# Patient Record
Sex: FEMALE | Race: WHITE | Employment: UNEMPLOYED | URBAN - METROPOLITAN AREA
[De-identification: names, ages, dates, MRNs, and addresses within clinical notes are randomized per-mention and may not be internally consistent; named-entity substitution may affect disease eponyms.]

---

## 2021-08-17 ENCOUNTER — TRANSCRIBE ORDER (OUTPATIENT)
Dept: SCHEDULING | Age: 56
End: 2021-08-17

## 2021-08-17 DIAGNOSIS — Z12.31 VISIT FOR SCREENING MAMMOGRAM: Primary | ICD-10-CM

## 2021-10-25 ENCOUNTER — HOSPITAL ENCOUNTER (OUTPATIENT)
Dept: MAMMOGRAPHY | Age: 56
Discharge: HOME | End: 2021-10-25
Attending: SPECIALIST
Payer: COMMERCIAL

## 2021-10-25 ENCOUNTER — TRANSCRIBE ORDER (OUTPATIENT)
Dept: GENERAL RADIOLOGY | Age: 56
End: 2021-10-25

## 2021-10-25 DIAGNOSIS — R92.8 ABNORMAL MAMMOGRAM: Primary | ICD-10-CM

## 2021-10-25 DIAGNOSIS — Z12.31 VISIT FOR SCREENING MAMMOGRAM: ICD-10-CM

## 2021-10-25 PROCEDURE — 77067 SCR MAMMO BI INCL CAD: CPT

## 2021-11-04 ENCOUNTER — HOSPITAL ENCOUNTER (OUTPATIENT)
Dept: MAMMOGRAPHY | Age: 56
Discharge: HOME | End: 2021-11-04
Attending: SPECIALIST
Payer: COMMERCIAL

## 2021-11-04 ENCOUNTER — HOSPITAL ENCOUNTER (OUTPATIENT)
Dept: ULTRASOUND IMAGING | Age: 56
Discharge: HOME | End: 2021-11-04
Attending: SPECIALIST
Payer: COMMERCIAL

## 2021-11-04 DIAGNOSIS — R92.8 ABNORMAL MAMMOGRAM: ICD-10-CM

## 2021-11-04 PROCEDURE — 77061 BREAST TOMOSYNTHESIS UNI: CPT

## 2021-11-04 PROCEDURE — 76642 ULTRASOUND BREAST LIMITED: CPT

## 2021-11-05 ENCOUNTER — TRANSCRIBE ORDER (OUTPATIENT)
Dept: SCHEDULING | Age: 56
End: 2021-11-05

## 2021-11-05 DIAGNOSIS — N63.0 LUMP OR MASS IN BREAST: Primary | ICD-10-CM

## 2021-11-12 ENCOUNTER — HOSPITAL ENCOUNTER (OUTPATIENT)
Dept: MAMMOGRAPHY | Age: 56
Discharge: HOME | End: 2021-11-12
Attending: SPECIALIST
Payer: COMMERCIAL

## 2021-11-12 ENCOUNTER — HOSPITAL ENCOUNTER (OUTPATIENT)
Dept: ULTRASOUND IMAGING | Age: 56
Discharge: HOME | End: 2021-11-12
Attending: SPECIALIST
Payer: COMMERCIAL

## 2021-11-12 DIAGNOSIS — R92.8 ABNORMAL MAMMOGRAM OF RIGHT BREAST: ICD-10-CM

## 2021-11-12 DIAGNOSIS — N63.0 LUMP OR MASS IN BREAST: ICD-10-CM

## 2021-11-12 PROCEDURE — 88360 TUMOR IMMUNOHISTOCHEM/MANUAL: CPT

## 2021-11-12 PROCEDURE — 77065 DX MAMMO INCL CAD UNI: CPT

## 2021-11-12 PROCEDURE — 88305 TISSUE EXAM BY PATHOLOGIST: CPT

## 2021-11-12 PROCEDURE — 74011000250 HC RX REV CODE- 250: Performed by: RADIOLOGY

## 2021-11-12 PROCEDURE — 77030020004 US BX BREAST VAC RT 1ST LESION W/CLIP AND SPECIMEN

## 2021-11-12 PROCEDURE — 88342 IMHCHEM/IMCYTCHM 1ST ANTB: CPT

## 2021-11-12 PROCEDURE — 88341 IMHCHEM/IMCYTCHM EA ADD ANTB: CPT

## 2021-11-12 RX ORDER — LIDOCAINE HYDROCHLORIDE 10 MG/ML
8 INJECTION, SOLUTION EPIDURAL; INFILTRATION; INTRACAUDAL; PERINEURAL
Status: COMPLETED | OUTPATIENT
Start: 2021-11-12 | End: 2021-11-12

## 2021-11-12 RX ORDER — LIDOCAINE HYDROCHLORIDE AND EPINEPHRINE 10; 10 MG/ML; UG/ML
1.5 INJECTION, SOLUTION INFILTRATION; PERINEURAL ONCE
Status: COMPLETED | OUTPATIENT
Start: 2021-11-12 | End: 2021-11-12

## 2021-11-12 RX ADMIN — LIDOCAINE HYDROCHLORIDE 8 ML: 10 INJECTION, SOLUTION EPIDURAL; INFILTRATION; INTRACAUDAL; PERINEURAL at 13:00

## 2021-11-12 RX ADMIN — LIDOCAINE HYDROCHLORIDE AND EPINEPHRINE 15 MG: 10; 10 INJECTION, SOLUTION INFILTRATION; PERINEURAL at 13:00

## 2021-11-12 NOTE — PROGRESS NOTES
Mammogram completed. Ok per Dr. Deonna Mendoza to discharge patient. Ice pack provided, instructions for use reviewed.

## 2021-11-12 NOTE — PROGRESS NOTES
Discharge instructions have been reviewed with patient. Copy given to patient. Patient verbalized understanding of instructions and was given opportunity to ask questions and receive answers prior to leaving. Patient discharged to radiology waiting room in stable condition.

## 2021-11-12 NOTE — DISCHARGE INSTRUCTIONS
[address]  [phone]      Breast Biopsy Discharge Instructions          1. After the biopsy, we will place a clean covered ice pack over the biopsy site, within the bra - you should leave the ice pack on 30 minutes and then remove the ice pack for 1-2 hours until bedtime. If needed you can continue applying ice the following day. It is a good idea to wear your bra for support, both day and night unless this causes you discomfort. 2. You may take Tylenol (two tablets) every 4 to 6 hours as needed for pain. Do not take aspirin or aspirin products (e.g. ibuprofen, Advil, Motrin) as these may cause more bleeding. 3. You may expect some bruising and skin discoloration in the biopsy area. This is normal and generally should resolve in 5 to 7 days. 4. Most women do not find it necessary to restrict their activities after the procedure. You should rest as needed on the day of your biopsy. The next day, if you are feeling okay, you may resume your regular work/activity schedule. Avoid strenuous activity and heavy lifting, jogging, aerobics, or vacuuming for 48 hours after the procedure. 5. 48 hours after your biopsy, remove the large outer dressing and leave the steri-strips (tiny pieces of tape) in place. The steri-strips will usually fall off in a few days. You may shower 48 hours after your biopsy and you may get the steri-strips wet. If still present after 4 days, you may gently peel the strips off. Keep the area clean and dry and shower daily. 6. If you have bleeding from the incision area, hold firm pressure on the area for 20 minutes. This should control any slight oozing that might occur.   If you develop persistent bleeding or pain which does not respond to the above measures or if you develop a fever, excessive swelling, redness, heat or drainage, please call the Breast Health Navigator at [phone] during normal business hours (7 a.m. - 5 p.m.). After business hours, call [phone] and ask for the on-call Radiology Nurse to be paged, or your referring physician. 7. You will receive your biopsy results (pathology report) in 3-5 business days - the radiologist will review your results and you will receive a call from the radiology department and/or from your doctor.       Physician :_Dr. Michelle Stacy__________     Nurse: Sumi Wiley RN__________        Tech: ______________

## 2021-11-22 ENCOUNTER — NURSE NAVIGATOR (OUTPATIENT)
Dept: CASE MANAGEMENT | Age: 56
End: 2021-11-22

## 2021-11-22 NOTE — PROGRESS NOTES
[facility]   Breast Navigator Encounter    Name:    Benjamin Rowland  Age:    49 y.o. Diagnosis:    RIGHT breast DCIS with microinvasion     Interdisciplinary Team:  Med-Onc:    Surg-Onc:    Dr. Tamy Mckeon:    Plastics:    :     Nurse Navigator:  Kathie Martins RN, BSN, CBCN      Encounter type:  []Patient Initiated  []Navigator Follow-up []Pre-op  []Post-op  []Check-in Prior to First Treatment []Treatment Modality Change  []Survivorship Transition [x]Other:   Initial Navigator Encounter    Narrative: Attempted to reach out to patient prior to her appointment next week. She was not available, so I left a message asking her to call me back at her convenience. I mentioned the possibility of a breast MRI prior to her appointment. 11/23/2021 8:30 am - Spoke to patient. She is willing to get an MRI prior to her appointment next week. After speaking to EMEKA Lynn, NN in MRI, she is scheduled for Friday 11/26 at 10:00 am with arrival at 9:30 am.  She has had MRIs before, and she has no problems tolerating these. I explained what happens at the first consultation - an exam by the physician, a possible ultrasound, then complete discussion of surgical options and other treatment that may be considered. I encouraged the patient to bring  someone with her to this appointment since there is a lot of information that will be covered. She will come alone, but would like to conference her mom in on the phone, and I told her that was perfectly fine. She asked if she needed to call Dr. Jaciel Montes, her GYN. I told her that I added him to her care team so that he would get a copy of all of Dr. Carmen Christensen notes. Provided the patient with my contact information and discussed my role in her care. Will continue to follow patient throughout  the care continuum. I will plan to meet her when she comes in next week to see Dr. Mack Harada.           Kathie Martins RN, BSN, [facility] [address] [facility].  [place]rie Chamber[place]: [phone]  F: [phone]  [email or web address]  Good Help to Those in [facility]

## 2021-11-23 DIAGNOSIS — C50.911 DUCTAL CARCINOMA IN SITU (DCIS) OF RIGHT BREAST WITH MICROINVASIVE COMPONENT (HCC): Primary | ICD-10-CM

## 2021-11-26 ENCOUNTER — OFFICE VISIT (OUTPATIENT)
Dept: SURGERY | Age: 56
End: 2021-11-26
Payer: COMMERCIAL

## 2021-11-26 ENCOUNTER — HOSPITAL ENCOUNTER (OUTPATIENT)
Dept: MRI IMAGING | Age: 56
Discharge: HOME | End: 2021-11-26
Attending: SURGERY
Payer: COMMERCIAL

## 2021-11-26 VITALS
HEART RATE: 89 BPM | HEIGHT: 65 IN | BODY MASS INDEX: 26.66 KG/M2 | DIASTOLIC BLOOD PRESSURE: 70 MMHG | SYSTOLIC BLOOD PRESSURE: 151 MMHG | WEIGHT: 160 LBS

## 2021-11-26 DIAGNOSIS — C50.911 DUCTAL CARCINOMA IN SITU (DCIS) OF RIGHT BREAST WITH MICROINVASIVE COMPONENT (HCC): ICD-10-CM

## 2021-11-26 DIAGNOSIS — C50.511 MALIGNANT NEOPLASM OF LOWER-OUTER QUADRANT OF RIGHT BREAST OF FEMALE, ESTROGEN RECEPTOR POSITIVE (HCC): Primary | ICD-10-CM

## 2021-11-26 DIAGNOSIS — Z17.0 MALIGNANT NEOPLASM OF LOWER-OUTER QUADRANT OF RIGHT BREAST OF FEMALE, ESTROGEN RECEPTOR POSITIVE (HCC): Primary | ICD-10-CM

## 2021-11-26 PROCEDURE — 74011250636 HC RX REV CODE- 250/636

## 2021-11-26 PROCEDURE — 76642 ULTRASOUND BREAST LIMITED: CPT | Performed by: SURGERY

## 2021-11-26 PROCEDURE — 77030021566 MRI BREAST BI W WO CONT

## 2021-11-26 PROCEDURE — 74011000258 HC RX REV CODE- 258: Performed by: SURGERY

## 2021-11-26 PROCEDURE — 99205 OFFICE O/P NEW HI 60 MIN: CPT | Performed by: SURGERY

## 2021-11-26 PROCEDURE — A9576 INJ PROHANCE MULTIPACK: HCPCS

## 2021-11-26 RX ORDER — SODIUM CHLORIDE 0.9 % (FLUSH) 0.9 %
10 SYRINGE (ML) INJECTION
Status: COMPLETED | OUTPATIENT
Start: 2021-11-26 | End: 2021-11-26

## 2021-11-26 RX ORDER — VENLAFAXINE HYDROCHLORIDE 75 MG/1
CAPSULE, EXTENDED RELEASE ORAL DAILY
COMMUNITY

## 2021-11-26 RX ADMIN — Medication 10 ML: at 11:15

## 2021-11-26 RX ADMIN — SODIUM CHLORIDE 100 ML: 900 INJECTION, SOLUTION INTRAVENOUS at 12:00

## 2021-11-26 RX ADMIN — GADOTERIDOL 15 ML: 279.3 INJECTION, SOLUTION INTRAVENOUS at 11:15

## 2021-11-26 NOTE — PROGRESS NOTES
HISTORY OF PRESENT ILLNESS  Roger Pedroza is a 49 y.o. female. HPI  NEW patient consult referred by  Dr. Alexa Sparks for RIGHT breast micro invasive carcinoma and DCIS. Breast cancer -  11/12/21 RIGHT breast biopsy - micro invasive, gr 1, %, UT 5%, HER2 NEG, KI67 1%, AND DCIS gr 1, %, UT 40%    Family History: Mother, breast and bladder cancer, dx at age 77 LEFT breast       Mammogram, 11/04/21, [facility]  BREAST SONOGRAPHY of the right breast shows an ill-defined 5 mm hypoechoic focus  with posterior shadowing, 8:00 location approximately 3 cm from the nipple  No past medical history on file. No past surgical history on file. Social History     Socioeconomic History    Marital status:      Spouse name: Not on file    Number of children: Not on file    Years of education: Not on file    Highest education level: Not on file   Occupational History    Not on file   Tobacco Use    Smoking status: Never Smoker    Smokeless tobacco: Never Used   Substance and Sexual Activity    Alcohol use: Not Currently    Drug use: Never    Sexual activity: Not on file   Other Topics Concern    Not on file   Social History Narrative    Not on file     Social Determinants of Health     Financial Resource Strain:     Difficulty of Paying Living Expenses: Not on file   Food Insecurity:     Worried About Running Out of Food in the Last Year: Not on file    [place] of Food in the Last Year: Not on file   Transportation Needs:     Lack of Transportation (Medical): Not on file    Lack of Transportation (Non-Medical):  Not on file   Physical Activity:     Days of Exercise per Week: Not on file    Minutes of Exercise per Session: Not on file   Stress:     Feeling of Stress : Not on file   Social Connections:     Frequency of Communication with Friends and Family: Not on file    Frequency of Social Gatherings with Friends and Family: Not on file    Attends Zoroastrianism Services: Not on file   [place] Active Member of Clubs or Organizations: Not on file    Attends Club or Organization Meetings: Not on file    Marital Status: Not on file   Intimate Partner Violence:     Fear of Current or Ex-Partner: Not on file    Emotionally Abused: Not on file    Physically Abused: Not on file    Sexually Abused: Not on file   Housing Stability:     Unable to Pay for Housing in the Last Year: Not on file    Number of [place] in the Last Year: Not on file    Unstable Housing in the Last Year: Not on file     OB History    No obstetric history on file. Obstetric Comments   Menarche 8, LMP AT ag 38, # of children 2, age of 4st delivery 47, Hysterectomy/oophorectomy no/no, Breast bx no, history of breast feeding shirley, BCP yes, Hormone therapy no             Current Outpatient Medications:     venlafaxine-SR (Effexor XR) 75 mg capsule, Take  by mouth daily. , Disp: , Rfl:   No current facility-administered medications for this visit. Facility-Administered Medications Ordered in Other Visits:     gadoteridoL (PROHANCE) 279.3 mg/mL contrast solution, , , ,   No Known Allergies    Review of Systems   HENT: Positive for congestion. All other systems reviewed and are negative. Physical Exam  Vitals and nursing note reviewed. Chest:   Breasts: Breasts are symmetrical.      Right: No inverted nipple, mass, nipple discharge, skin change, tenderness, axillary adenopathy or supraclavicular adenopathy. Left: No inverted nipple, mass, nipple discharge, skin change, tenderness, axillary adenopathy or supraclavicular adenopathy. Lymphadenopathy:      Cervical: No cervical adenopathy. Upper Body:      Right upper body: No supraclavicular, axillary or pectoral adenopathy. Left upper body: No supraclavicular, axillary or pectoral adenopathy. BREAST ULTRASOUND, Preop planning  Indication:preop planning  right Breast 8:00    Technique:   The area was scanned using a high-frequency linear-array near-field transducer  Findings: 5 mm irregular mass with dropout and clip   Impression: Biopsy site visible with ultrasound  Disposition:  Will schedule lumpectomy with sentinel lymph node biopsy  After mri   ASSESSMENT and PLAN    ICD-10-CM ICD-9-CM    1. Malignant neoplasm of lower-outer quadrant of right breast of female, estrogen receptor positive ([facility]  C50.511 174.5     Z17.0 V86.0    50 yo female with right breast T1 mi N0 gr 1, %, SC 5%, HER2 NEG, KI67 1%, AND DCIS gr 1, %, SC 40%  She is here alone  I have reviewed the imaging and pathology with her and she was given copies of these reports. 90 minutes were spent face-to-face with the patient during this encounter and 90% of that time was spent on counseling and coordination of care. 1. Discussed lumpectomy and radiation vs mastectomy. Discussed reconstruction. MRI ordered to see if patient is a candidate for a lumpectomy. 2. Discussed sentinel lymph node biopsy. 3. Discussed external beam radiation. 4. Discussed hormone therapy. 5. Discussed the possibility of chemotherapy.      Will call her after mri  Send genetic testing  Plan is right us guided lumpectomy, sln biopsy

## 2021-11-26 NOTE — PROGRESS NOTES
HISTORY OF PRESENT ILLNESS  Jamilah Farooq is a 49 y.o. female. HPI  NEW patient . Referred by   Here for RIGHT breast invasive carcinoma and DCIS [place].             ROS    Physical Exam    ASSESSMENT and PLAN  {ASSESSMENT/PLAN:47067}

## 2021-11-26 NOTE — PATIENT INSTRUCTIONS
Breast Cancer: Care Instructions  Your Care Instructions     Breast cancer occurs when abnormal cells grow out of control in the breast. These cancer cells can spread within the breast, to nearby lymph nodes and other tissues, and to other parts of the body. Being treated for cancer can weaken your body, and you may feel very tired. Get the rest your body needs so you can feel better. Finding out that you have cancer is scary. You may feel many emotions and may need some help coping. Seek out family, friends, and counselors for support. You also can do things at home to make yourself feel better while you go through treatment. Call the [employer] (8-705.907.6998) or visit its website at [employer]com for more information. Follow-up care is a key part of your treatment and safety. Be sure to make and go to all appointments, and call your doctor if you are having problems. It's also a good idea to know your test results and keep a list of the medicines you take. How can you care for yourself at home? · Take your medicines exactly as prescribed. Call your doctor if you think you are having a problem with your medicine. You may get medicine for nausea and vomiting if you have these side effects. · Follow your doctor's instructions to relieve pain. Pain from cancer and surgery can almost always be controlled. Use pain medicine when you first notice pain, before it becomes severe. · Eat healthy food. If you do not feel like eating, try to eat food that has protein and extra calories to keep up your strength and prevent weight loss. Drink liquid meal replacements for extra calories and protein. Try to eat your main meal early. · Get some physical activity every day, but do not get too tired. Keep doing the hobbies you enjoy as your energy allows. · Do not smoke. Smoking can make your cancer worse. If you need help quitting, talk to your doctor about stop-smoking programs and medicines.  These can increase your chances of quitting for good. · Take steps to control your stress and workload. Learn relaxation techniques. ? Share your feelings. Stress and tension affect our emotions. By expressing your feelings to others, you may be able to understand and cope with them. ? Consider joining a support group. Talking about a problem with your spouse, a good friend, or other people with similar problems is a good way to reduce tension and stress. ? Express yourself through art. Try writing, crafts, dance, or art to relieve stress. Some dance, writing, or art groups may be available just for people who have cancer. ? Be kind to your body and mind. Getting enough sleep, eating a healthy diet, and taking time to do things you enjoy can contribute to an overall feeling of balance in your life and can help reduce stress. ? Get help if you need it. Discuss your concerns with your doctor or counselor. · If you are vomiting or have diarrhea:  ? Drink plenty of fluids to prevent dehydration. Choose water and other clear liquids. If you have kidney, heart, or liver disease and have to limit fluids, talk with your doctor before you increase the amount of fluids you drink. ? When you are able to eat, try clear soups, mild foods, and liquids until all symptoms are gone for 12 to 48 hours. Other good choices include dry toast, crackers, cooked cereal, and gelatin dessert, such as Jell-O.  · If you have not already done so, prepare a list of advance directives. Advance directives are instructions to your doctor and family members about what kind of care you want if you become unable to speak or express yourself. When should you call for help? Call 911 anytime you think you may need emergency care. For example, call if:    · You passed out (lost consciousness).    Call your doctor now or seek immediate medical care if:    · You have a fever.     · You have abnormal bleeding.     · You think you have an infection.     · You have new or worse pain.     · You have new symptoms, such as a cough, belly pain, vomiting, diarrhea, or a rash. Watch closely for changes in your health, and be sure to contact your doctor if:    · You are much more tired than usual.     · You have swollen glands in your armpits, groin, or neck.     · You do not get better as expected. Where can you learn more? Go to http://www.martin.com/  Enter V321 in the search box to learn more about \"Breast Cancer: Care Instructions. \"  Current as of: December 17, 2020               Content Version: 13.0  © 3966-9435 [employer]. Care instructions adapted under license by [employer] (which disclaims liability or warranty for this information). If you have questions about a medical condition or this instruction, always ask your healthcare professional. [address] any warranty or liability for your use of this information.

## 2021-11-30 NOTE — PROGRESS NOTES
Called with breast mri  Amenable to lump  Surgery order in  She sent the gene test over weekend  Proceed with surgical scheduling.

## 2021-12-08 ENCOUNTER — DOCUMENTATION ONLY (OUTPATIENT)
Dept: SURGERY | Age: 56
End: 2021-12-08

## 2021-12-08 NOTE — PROGRESS NOTES
Faxed progress notes and pathology report to [address] [place] [address] for coverage for breast MRI. Needed documents to substantiate cancer diagnosis. Confirmation fax received.

## 2021-12-09 ENCOUNTER — TRANSCRIBE ORDER (OUTPATIENT)
Dept: REGISTRATION | Age: 56
End: 2021-12-09

## 2021-12-09 DIAGNOSIS — Z01.812 PRE-PROCEDURE LAB EXAM: Primary | ICD-10-CM

## 2021-12-14 DIAGNOSIS — C50.511 MALIGNANT NEOPLASM OF LOWER-OUTER QUADRANT OF RIGHT BREAST OF FEMALE, ESTROGEN RECEPTOR POSITIVE (HCC): Primary | ICD-10-CM

## 2021-12-14 DIAGNOSIS — Z17.0 MALIGNANT NEOPLASM OF LOWER-OUTER QUADRANT OF RIGHT BREAST OF FEMALE, ESTROGEN RECEPTOR POSITIVE (HCC): Primary | ICD-10-CM

## 2021-12-17 ENCOUNTER — PATIENT MESSAGE (OUTPATIENT)
Dept: SURGERY | Age: 56
End: 2021-12-17

## 2021-12-17 ENCOUNTER — HOSPITAL ENCOUNTER (OUTPATIENT)
Dept: PREADMISSION TESTING | Age: 56
Discharge: HOME | End: 2021-12-17
Payer: COMMERCIAL

## 2021-12-17 VITALS
SYSTOLIC BLOOD PRESSURE: 126 MMHG | HEART RATE: 95 BPM | OXYGEN SATURATION: 97 % | HEIGHT: 65 IN | BODY MASS INDEX: 28.1 KG/M2 | TEMPERATURE: 98.6 F | WEIGHT: 168.65 LBS | DIASTOLIC BLOOD PRESSURE: 70 MMHG

## 2021-12-17 LAB
BASOPHILS # BLD: 0.1 K/UL (ref 0–0.1)
BASOPHILS NFR BLD: 1 % (ref 0–1)
DIFFERENTIAL METHOD BLD: NORMAL
EOSINOPHIL # BLD: 0.3 K/UL (ref 0–0.4)
EOSINOPHIL NFR BLD: 3 % (ref 0–7)
ERYTHROCYTE [DISTWIDTH] IN BLOOD BY AUTOMATED COUNT: 12.9 % (ref 11.5–14.5)
HCT VFR BLD AUTO: 42.8 % (ref 35–47)
HGB BLD-MCNC: 14.2 G/DL (ref 11.5–16)
IMM GRANULOCYTES # BLD AUTO: 0 K/UL (ref 0–0.04)
IMM GRANULOCYTES NFR BLD AUTO: 0 % (ref 0–0.5)
LYMPHOCYTES # BLD: 3.2 K/UL (ref 0.8–3.5)
LYMPHOCYTES NFR BLD: 30 % (ref 12–49)
MCH RBC QN AUTO: 29.9 PG (ref 26–34)
MCHC RBC AUTO-ENTMCNC: 33.2 G/DL (ref 30–36.5)
MCV RBC AUTO: 90.1 FL (ref 80–99)
MONOCYTES # BLD: 0.7 K/UL (ref 0–1)
MONOCYTES NFR BLD: 7 % (ref 5–13)
NEUTS SEG # BLD: 6 K/UL (ref 1.8–8)
NEUTS SEG NFR BLD: 59 % (ref 32–75)
NRBC # BLD: 0 K/UL (ref 0–0.01)
NRBC BLD-RTO: 0 PER 100 WBC
PLATELET # BLD AUTO: 399 K/UL (ref 150–400)
PMV BLD AUTO: 9.5 FL (ref 8.9–12.9)
RBC # BLD AUTO: 4.75 M/UL (ref 3.8–5.2)
WBC # BLD AUTO: 10.4 K/UL (ref 3.6–11)

## 2021-12-17 PROCEDURE — 85025 COMPLETE CBC W/AUTO DIFF WBC: CPT

## 2021-12-17 NOTE — PERIOP NOTES
[facility]    Surgery Date:   12/28/21    Surgery arrival time given by surgeon: YES     If no, [facility] staff will call you between 4 PM- 8 PM the day before surgery with your arrival time. If your surgery is on a Monday, we will call you the preceding Friday. Please call [phone] after 8 PM if you did not receive your arrival time. 1. Please report to [facility] Patient Access/Admitting on the 1st floor. Bring your insurance card, photo identification, and any copayment ( if applicable). 2. If you are going home the same day of your surgery, you must have a responsible adult to drive you home. You need to have a responsible adult to stay with you the first 24 hours after surgery and you should not drive a car for 24 hours following your surgery. 3. Do NOT eat any solid foods after midnight the night before surgery including candy, mint or gum. You may drink clear liquids from midnight until 1 hour prior to your arrival. You may drink up to 12 ounces at one time every 4 hours. Please note special instructions, if applicable, below for medications. 4. Do NOT drink alcohol or smoke 24 hours before surgery. STOP smoking for 14 days prior as it helps with breathing and healing after surgery. 5. If you are being admitted to the hospital, please leave personal belongings/luggage in your car until you have an assigned hospital room number. 6. Please wear comfortable clothes. Wear your glasses instead of contacts. We ask that all money, jewelry and valuables be left at home. Wear no make up, particularly mascara, the day of surgery. 7.  All body piercings, rings, and jewelry need to be removed and left at home. Please wear your hair loose or down. Please no pony-tails, buns, or any metal hair accessories. If you shower the morning of surgery, please do not apply any lotions or powders afterwards. You may wear deodorant, unless having breast surgery.   Do not shave any body area within 24 hours of your surgery. 8. Please follow all instructions to avoid any potential surgical cancellation. 9. Should your physical condition change, (i.e. fever, cold, flu, etc.) please notify your surgeon as soon as possible. 10. It is important to be on time. If a situation occurs where you may be delayed, please call:  [phone] / [phone] on the day of surgery. 11. The Preadmission Testing staff can be reached at [phone]. 12. Special instructions: NONE      Current Outpatient Medications   Medication Sig    venlafaxine-SR (Effexor XR) 75 mg capsule Take  by mouth daily. No current facility-administered medications for this encounter. 1. YOU MUST ONLY TAKE THESE MEDICATIONS THE MORNING OF SURGERY WITH A SIP OF WATER: EFFEXOR  2. MEDICATIONS TO TAKE THE MORNING OF SURGERY ONLY IF NEEDED: NONE  3. HOLD these medications BEFORE Surgery: N/A  4. Ask your surgeon/prescribing physician about when/if to STOP taking these medications: N/A  5. Stop all vitamins, herbal medicines and Aspirin containing products 7 days prior to surgery. Stop any non-steroidal anti-inflammatory drugs (i.e. Ibuprofen, Naproxen, Advil, Aleve) 3 days before surgery. You may take Tylenol. 6. If you are currently taking Plavix, Coumadin,or any other blood-thinning/anticoagulant medication contact your prescribing physician for instructions. Eating and Drinking Before Surgery     You may eat a regular dinner at the usual time on the day before your surgery.  Do NOT eat any solid foods after midnight unless your arrival time at the hospital is 3pm or later.  You may drink clear liquids only from 12 midnight until 1 hours prior to your arrival time at the hospital on the day of your surgery. Do NOT drink alcohol.    Clear liquids include:  o Water  o Fruit juices without pulp( i.e. apple juice)  o Carbonated beverages  o Black coffee (no cream/milk)  o Tea (no cream/milk)  o Gatorade   You may drink up to 12-16 ounces at one time every 4 hours between the hours of midnight and 1 hour before your arrival time at the hospital. Example- if your arrival time at the hospital is 6am, you may drink 12-16 ounces of clear liquids no later than 5am.   If your arrival time at the hospital is 3pm or later, you may eat a light breakfast before 8am.   A light breakfast includes:  o Toast or bagel (no butter)  o Black coffee (no cream/milk)  o Tea (no cream/milk)  o Fruit juices without pulp ( i.e. apple juice)  o Do NOT eat meat, eggs, vegetables or fruit   If you have any questions, please contact your surgeon's office. Preventing Infections Before and After  Your Surgery    IMPORTANT INSTRUCTIONS    Please read and follow these instructions carefully. If you are unable to comply with the below instructions your procedure will be cancelled. You play an important role in your health and preparation for surgery. To reduce the germs on your skin you will need to shower with CHG soap (Chorhexidine gluconate 4%) two times before surgery. CHG soap (Hibiclens, Hex-A-Clens or store brand)   CHG soap will be provided at your Preadmission Testing (PAT) appointment.  If you do not have a PAT appointment before surgery, you may arrange to  CHG soap from our office or purchase CHG soap at a pharmacy, grocery or department store.  You need to purchase TWO 4 ounce bottles to use for your 2 showers. Steps to follow:  1. Wash your hair with your normal shampoo and your body with regular soap and rinse well to remove shampoo and soap from your skin. 2. Wet a clean washcloth and turn off the shower. 3. Put CHG soap on washcloth and apply to your entire body from the neck down. Do not use on your head, face or private parts(genitals). Do not use CHG soap on open sores, wounds or areas of skin irritation. 4. Wash you body gently for 5 minutes.  Do not wash your skin too hard. This soap does not create lather. Pay special attention to your underarms and from your belly button to your feet. 5. Turn the shower back on and rinse well to get CHG soap off your body. 6. Pat your skin dry with a clean, dry towel. Do not apply lotions or moisturizer. 7. Put on clean clothes and sleep on fresh bed sheets and do not allow pets to sleep with you. Shower with CHG soap 2 times before your surgery   The evening before your surgery   The morning of your surgery      Tips to help prevent infections after your surgery:  1. Protect your surgical wound from germs:  ? Hand washing is the most important thing you and your caregivers can do to prevent infections. ? Keep your bandage clean and dry! ? Do not touch your surgical wound. 2. Use clean, freshly washed towels and washcloths every time you shower; do not share bath linens with others. 3. Until your surgical wound is healed, wear clothing and sleep on bed linens each day that are clean and freshly washed. 4. Do not allow pets to sleep in your bed with you or touch your surgical wound. 5. Do not smoke  smoking delays wound healing. This may be a good time to stop smoking. 6. If you have diabetes, it is important for you to manage your blood sugar levels properly before your surgery as well as after your surgery. Poorly managed blood sugar levels slow down wound healing and prevent you from healing completely. [facility]   Instructions for Pre-Surgery COVID-19 Testing     Across our ministry we have established standard guidelines to ensure the health and safety of our patients, residents and associates as we resume elective services for patients. All patients presenting for surgery are required to have a COVID-19 test result within 96 hours of their scheduled surgery. [facility] is providing this test free of charge to the patient.    Instructions for COVID-19 Testing:     Patients will receive a call from Pre-Admission Testing 4-5 days prior to surgery to schedule a date and time to come to the [address] [place] [address] Drive for their COVID-19 test   Patients are advised to self-quarantine after testing until their scheduled surgery   Once on site, patients will be registered and receive COVID test in their vehicle   If a patient is scheduled for normal Pre Admission Testing 96 hours from date of surgery, the patient will still have their COVID test done at the [address] [place] [address] located at [address] [place] [address] Positive results will be shared with the surgeon and anesthesiologist and may result in cancellation of the elective procedure    Testing Hours and Location:   Address:  [address] [place] [address] Pre Admission 11 [facility] in the Discharge Lot on [facility] (Map Attached)  174 [facility], [address]   Hours: Monday- Friday 7a-3p    PAT Phone Number: [phone]              Patient Information Regarding COVID Restrictions    Patients are advised to self-quarantine after COVID testing up to the day of the scheduled procedure. Day of Procedure     Please park in the parking deck or any designated visitor parking lot.  Enter the facility through the Main Entrance of the hospital.   A temperature check and appropriate symptom/exposure screening will be done prior to entry to the facility.  On the day of surgery, please provide the cell phone number of the person who will be waiting for you to the Patient Access representative at the time of registration.  Please wear a mask on the day of your procedure.  We are now allowing one designated visitor per stay. Pediatric patients may have 2 designated visitors. This one person may come in with you on the day of your procedure.  No visitors under the age of 13.  The designated visitor must also wear a mask.    Once your procedure and the immediate recovery period is completed, a nurse in the recovery area will contact your designated visitor to inform them of your room number or to otherwise review other pertinent information regarding your care.  Social distancing practices are to be adhered to in waiting areas and the cafeteria. The patient was contacted  in person. She  verbalize  understanding of all instructions does not  need reinforcement.

## 2021-12-23 ENCOUNTER — HOSPITAL ENCOUNTER (OUTPATIENT)
Dept: PREADMISSION TESTING | Age: 56
Discharge: HOME | End: 2021-12-23
Attending: SURGERY
Payer: COMMERCIAL

## 2021-12-23 DIAGNOSIS — Z01.812 PRE-PROCEDURE LAB EXAM: ICD-10-CM

## 2021-12-23 PROCEDURE — U0005 INFEC AGEN DETEC AMPLI PROBE: HCPCS

## 2021-12-26 LAB
SARS-COV-2, XPLCVT: NOT DETECTED
SOURCE, COVRS: NORMAL

## 2021-12-27 ENCOUNTER — ANESTHESIA EVENT (OUTPATIENT)
Dept: SURGERY | Age: 56
End: 2021-12-27
Payer: COMMERCIAL

## 2021-12-28 ENCOUNTER — HOSPITAL ENCOUNTER (OUTPATIENT)
Age: 56
Setting detail: OUTPATIENT SURGERY
Discharge: HOME | End: 2021-12-28
Attending: SURGERY | Admitting: SURGERY
Payer: COMMERCIAL

## 2021-12-28 ENCOUNTER — ANESTHESIA (OUTPATIENT)
Dept: SURGERY | Age: 56
End: 2021-12-28
Payer: COMMERCIAL

## 2021-12-28 ENCOUNTER — APPOINTMENT (OUTPATIENT)
Dept: NUCLEAR MEDICINE | Age: 56
End: 2021-12-28
Attending: SURGERY
Payer: COMMERCIAL

## 2021-12-28 VITALS
RESPIRATION RATE: 15 BRPM | WEIGHT: 168 LBS | HEIGHT: 65 IN | TEMPERATURE: 97.8 F | HEART RATE: 73 BPM | SYSTOLIC BLOOD PRESSURE: 122 MMHG | OXYGEN SATURATION: 95 % | DIASTOLIC BLOOD PRESSURE: 64 MMHG | BODY MASS INDEX: 27.99 KG/M2

## 2021-12-28 DIAGNOSIS — C50.511 MALIGNANT NEOPLASM OF LOWER-OUTER QUADRANT OF RIGHT BREAST OF FEMALE, ESTROGEN RECEPTOR POSITIVE (HCC): ICD-10-CM

## 2021-12-28 DIAGNOSIS — Z17.0 MALIGNANT NEOPLASM OF LOWER-OUTER QUADRANT OF RIGHT BREAST OF FEMALE, ESTROGEN RECEPTOR POSITIVE (HCC): ICD-10-CM

## 2021-12-28 PROCEDURE — C1894 INTRO/SHEATH, NON-LASER: HCPCS | Performed by: SURGERY

## 2021-12-28 PROCEDURE — 76060000034 HC ANESTHESIA 1.5 TO 2 HR: Performed by: SURGERY

## 2021-12-28 PROCEDURE — 77030008684 HC TU ET CUF COVD -B: Performed by: ANESTHESIOLOGY

## 2021-12-28 PROCEDURE — 74011000250 HC RX REV CODE- 250: Performed by: NURSE ANESTHETIST, CERTIFIED REGISTERED

## 2021-12-28 PROCEDURE — 74011250636 HC RX REV CODE- 250/636: Performed by: NURSE ANESTHETIST, CERTIFIED REGISTERED

## 2021-12-28 PROCEDURE — 19301 PARTIAL MASTECTOMY: CPT | Performed by: SURGERY

## 2021-12-28 PROCEDURE — 76010000149 HC OR TIME 1 TO 1.5 HR: Performed by: SURGERY

## 2021-12-28 PROCEDURE — 76210000020 HC REC RM PH II FIRST 0.5 HR: Performed by: SURGERY

## 2021-12-28 PROCEDURE — 77030026438 HC STYL ET INTUB CARD -A: Performed by: ANESTHESIOLOGY

## 2021-12-28 PROCEDURE — 74011250636 HC RX REV CODE- 250/636: Performed by: SURGERY

## 2021-12-28 PROCEDURE — C9290 INJ, BUPIVACAINE LIPOSOME: HCPCS | Performed by: SURGERY

## 2021-12-28 PROCEDURE — 77030011825 HC SUPP SURG PSTOP S2SG -B: Performed by: SURGERY

## 2021-12-28 PROCEDURE — 77030040506 HC DRN WND MDII -A: Performed by: SURGERY

## 2021-12-28 PROCEDURE — 88307 TISSUE EXAM BY PATHOLOGIST: CPT

## 2021-12-28 PROCEDURE — 76210000017 HC OR PH I REC 1.5 TO 2 HR: Performed by: SURGERY

## 2021-12-28 PROCEDURE — 77030010507 HC ADH SKN DERMBND J&J -B: Performed by: SURGERY

## 2021-12-28 PROCEDURE — 77030034626 HC LIGASURE SM JAW SEAL OPN SURG COVD -E: Performed by: SURGERY

## 2021-12-28 PROCEDURE — 77030019702 HC WRP THER MENM -C: Performed by: SURGERY

## 2021-12-28 PROCEDURE — 76998 US GUIDE INTRAOP: CPT | Performed by: SURGERY

## 2021-12-28 PROCEDURE — A9520 TC99 TILMANOCEPT DIAG 0.5MCI: HCPCS

## 2021-12-28 PROCEDURE — 88342 IMHCHEM/IMCYTCHM 1ST ANTB: CPT

## 2021-12-28 PROCEDURE — 77030011267 HC ELECTRD BLD COVD -A: Performed by: SURGERY

## 2021-12-28 PROCEDURE — 74011250637 HC RX REV CODE- 250/637: Performed by: STUDENT IN AN ORGANIZED HEALTH CARE EDUCATION/TRAINING PROGRAM

## 2021-12-28 PROCEDURE — 74011250636 HC RX REV CODE- 250/636: Performed by: STUDENT IN AN ORGANIZED HEALTH CARE EDUCATION/TRAINING PROGRAM

## 2021-12-28 PROCEDURE — 77030019908 HC STETH ESOPH SIMS -A: Performed by: ANESTHESIOLOGY

## 2021-12-28 PROCEDURE — 77030041680 HC PNCL ELECSURG SMK EVAC CNMD -B: Performed by: SURGERY

## 2021-12-28 PROCEDURE — 38525 BIOPSY/REMOVAL LYMPH NODES: CPT | Performed by: SURGERY

## 2021-12-28 PROCEDURE — 77030002933 HC SUT MCRYL J&J -A: Performed by: SURGERY

## 2021-12-28 PROCEDURE — 2709999900 HC NON-CHARGEABLE SUPPLY: Performed by: SURGERY

## 2021-12-28 PROCEDURE — 77030040361 HC SLV COMPR DVT MDII -B: Performed by: SURGERY

## 2021-12-28 PROCEDURE — 77030002996 HC SUT SLK J&J -A: Performed by: SURGERY

## 2021-12-28 RX ORDER — SODIUM CHLORIDE, SODIUM LACTATE, POTASSIUM CHLORIDE, CALCIUM CHLORIDE 600; 310; 30; 20 MG/100ML; MG/100ML; MG/100ML; MG/100ML
125 INJECTION, SOLUTION INTRAVENOUS CONTINUOUS
Status: DISCONTINUED | OUTPATIENT
Start: 2021-12-28 | End: 2021-12-28 | Stop reason: HOSPADM

## 2021-12-28 RX ORDER — LIDOCAINE HYDROCHLORIDE 10 MG/ML
0.1 INJECTION, SOLUTION EPIDURAL; INFILTRATION; INTRACAUDAL; PERINEURAL AS NEEDED
Status: DISCONTINUED | OUTPATIENT
Start: 2021-12-28 | End: 2021-12-28 | Stop reason: HOSPADM

## 2021-12-28 RX ORDER — FENTANYL CITRATE 50 UG/ML
25 INJECTION, SOLUTION INTRAMUSCULAR; INTRAVENOUS
Status: DISCONTINUED | OUTPATIENT
Start: 2021-12-28 | End: 2021-12-28 | Stop reason: HOSPADM

## 2021-12-28 RX ORDER — HYDROMORPHONE HYDROCHLORIDE 2 MG/ML
INJECTION, SOLUTION INTRAMUSCULAR; INTRAVENOUS; SUBCUTANEOUS AS NEEDED
Status: DISCONTINUED | OUTPATIENT
Start: 2021-12-28 | End: 2021-12-28 | Stop reason: HOSPADM

## 2021-12-28 RX ORDER — FENTANYL CITRATE 50 UG/ML
50 INJECTION, SOLUTION INTRAMUSCULAR; INTRAVENOUS AS NEEDED
Status: DISCONTINUED | OUTPATIENT
Start: 2021-12-28 | End: 2021-12-28 | Stop reason: HOSPADM

## 2021-12-28 RX ORDER — ACETAMINOPHEN 500 MG
1000 TABLET ORAL ONCE
Status: COMPLETED | OUTPATIENT
Start: 2021-12-28 | End: 2021-12-28

## 2021-12-28 RX ORDER — SODIUM CHLORIDE 9 MG/ML
1000 INJECTION, SOLUTION INTRAVENOUS CONTINUOUS
Status: DISCONTINUED | OUTPATIENT
Start: 2021-12-28 | End: 2021-12-28 | Stop reason: HOSPADM

## 2021-12-28 RX ORDER — DEXAMETHASONE SODIUM PHOSPHATE 4 MG/ML
INJECTION, SOLUTION INTRA-ARTICULAR; INTRALESIONAL; INTRAMUSCULAR; INTRAVENOUS; SOFT TISSUE AS NEEDED
Status: DISCONTINUED | OUTPATIENT
Start: 2021-12-28 | End: 2021-12-28 | Stop reason: HOSPADM

## 2021-12-28 RX ORDER — FENTANYL CITRATE 50 UG/ML
INJECTION, SOLUTION INTRAMUSCULAR; INTRAVENOUS AS NEEDED
Status: DISCONTINUED | OUTPATIENT
Start: 2021-12-28 | End: 2021-12-28 | Stop reason: HOSPADM

## 2021-12-28 RX ORDER — SODIUM CHLORIDE 0.9 % (FLUSH) 0.9 %
5-40 SYRINGE (ML) INJECTION AS NEEDED
Status: DISCONTINUED | OUTPATIENT
Start: 2021-12-28 | End: 2021-12-28 | Stop reason: HOSPADM

## 2021-12-28 RX ORDER — ONDANSETRON 2 MG/ML
INJECTION INTRAMUSCULAR; INTRAVENOUS AS NEEDED
Status: DISCONTINUED | OUTPATIENT
Start: 2021-12-28 | End: 2021-12-28 | Stop reason: HOSPADM

## 2021-12-28 RX ORDER — MIDAZOLAM HYDROCHLORIDE 1 MG/ML
1 INJECTION, SOLUTION INTRAMUSCULAR; INTRAVENOUS AS NEEDED
Status: DISCONTINUED | OUTPATIENT
Start: 2021-12-28 | End: 2021-12-28 | Stop reason: HOSPADM

## 2021-12-28 RX ORDER — CEFAZOLIN SODIUM 1 G/3ML
INJECTION, POWDER, FOR SOLUTION INTRAMUSCULAR; INTRAVENOUS AS NEEDED
Status: DISCONTINUED | OUTPATIENT
Start: 2021-12-28 | End: 2021-12-28 | Stop reason: HOSPADM

## 2021-12-28 RX ORDER — SCOLOPAMINE TRANSDERMAL SYSTEM 1 MG/1
1 PATCH, EXTENDED RELEASE TRANSDERMAL
Status: DISCONTINUED | OUTPATIENT
Start: 2021-12-28 | End: 2021-12-28 | Stop reason: HOSPADM

## 2021-12-28 RX ORDER — SODIUM CHLORIDE, SODIUM LACTATE, POTASSIUM CHLORIDE, CALCIUM CHLORIDE 600; 310; 30; 20 MG/100ML; MG/100ML; MG/100ML; MG/100ML
INJECTION, SOLUTION INTRAVENOUS
Status: DISCONTINUED | OUTPATIENT
Start: 2021-12-28 | End: 2021-12-28 | Stop reason: HOSPADM

## 2021-12-28 RX ORDER — SODIUM CHLORIDE 0.9 % (FLUSH) 0.9 %
5-40 SYRINGE (ML) INJECTION EVERY 8 HOURS
Status: DISCONTINUED | OUTPATIENT
Start: 2021-12-28 | End: 2021-12-28 | Stop reason: HOSPADM

## 2021-12-28 RX ORDER — HYDROMORPHONE HYDROCHLORIDE 1 MG/ML
0.2 INJECTION, SOLUTION INTRAMUSCULAR; INTRAVENOUS; SUBCUTANEOUS
Status: DISCONTINUED | OUTPATIENT
Start: 2021-12-28 | End: 2021-12-28 | Stop reason: HOSPADM

## 2021-12-28 RX ORDER — OXYCODONE AND ACETAMINOPHEN 5; 325 MG/1; MG/1
1 TABLET ORAL AS NEEDED
Status: DISCONTINUED | OUTPATIENT
Start: 2021-12-28 | End: 2021-12-28 | Stop reason: HOSPADM

## 2021-12-28 RX ORDER — SODIUM CHLORIDE 9 MG/ML
125 INJECTION, SOLUTION INTRAVENOUS CONTINUOUS
Status: DISCONTINUED | OUTPATIENT
Start: 2021-12-28 | End: 2021-12-28 | Stop reason: HOSPADM

## 2021-12-28 RX ORDER — ONDANSETRON 2 MG/ML
4 INJECTION INTRAMUSCULAR; INTRAVENOUS AS NEEDED
Status: DISCONTINUED | OUTPATIENT
Start: 2021-12-28 | End: 2021-12-28 | Stop reason: HOSPADM

## 2021-12-28 RX ORDER — MORPHINE SULFATE 2 MG/ML
2 INJECTION, SOLUTION INTRAMUSCULAR; INTRAVENOUS
Status: DISCONTINUED | OUTPATIENT
Start: 2021-12-28 | End: 2021-12-28 | Stop reason: HOSPADM

## 2021-12-28 RX ORDER — DIPHENHYDRAMINE HYDROCHLORIDE 50 MG/ML
12.5 INJECTION, SOLUTION INTRAMUSCULAR; INTRAVENOUS AS NEEDED
Status: DISCONTINUED | OUTPATIENT
Start: 2021-12-28 | End: 2021-12-28 | Stop reason: HOSPADM

## 2021-12-28 RX ORDER — MIDAZOLAM HYDROCHLORIDE 1 MG/ML
0.5 INJECTION, SOLUTION INTRAMUSCULAR; INTRAVENOUS
Status: DISCONTINUED | OUTPATIENT
Start: 2021-12-28 | End: 2021-12-28 | Stop reason: HOSPADM

## 2021-12-28 RX ORDER — EPHEDRINE SULFATE/0.9% NACL/PF 50 MG/5 ML
5 SYRINGE (ML) INTRAVENOUS AS NEEDED
Status: DISCONTINUED | OUTPATIENT
Start: 2021-12-28 | End: 2021-12-28 | Stop reason: HOSPADM

## 2021-12-28 RX ORDER — PROPOFOL 10 MG/ML
INJECTION, EMULSION INTRAVENOUS AS NEEDED
Status: DISCONTINUED | OUTPATIENT
Start: 2021-12-28 | End: 2021-12-28 | Stop reason: HOSPADM

## 2021-12-28 RX ORDER — LIDOCAINE HYDROCHLORIDE 20 MG/ML
INJECTION, SOLUTION EPIDURAL; INFILTRATION; INTRACAUDAL; PERINEURAL AS NEEDED
Status: DISCONTINUED | OUTPATIENT
Start: 2021-12-28 | End: 2021-12-28 | Stop reason: HOSPADM

## 2021-12-28 RX ORDER — OXYCODONE AND ACETAMINOPHEN 5; 325 MG/1; MG/1
1 TABLET ORAL
Qty: 30 TABLET | Refills: 0 | Status: SHIPPED | OUTPATIENT
Start: 2021-12-28 | End: 2022-01-04

## 2021-12-28 RX ORDER — NEOSTIGMINE METHYLSULFATE 1 MG/ML
INJECTION INTRAVENOUS AS NEEDED
Status: DISCONTINUED | OUTPATIENT
Start: 2021-12-28 | End: 2021-12-28 | Stop reason: HOSPADM

## 2021-12-28 RX ORDER — SUCCINYLCHOLINE CHLORIDE 20 MG/ML
INJECTION INTRAMUSCULAR; INTRAVENOUS AS NEEDED
Status: DISCONTINUED | OUTPATIENT
Start: 2021-12-28 | End: 2021-12-28 | Stop reason: HOSPADM

## 2021-12-28 RX ORDER — MIDAZOLAM HYDROCHLORIDE 1 MG/ML
INJECTION, SOLUTION INTRAMUSCULAR; INTRAVENOUS AS NEEDED
Status: DISCONTINUED | OUTPATIENT
Start: 2021-12-28 | End: 2021-12-28 | Stop reason: HOSPADM

## 2021-12-28 RX ORDER — ONDANSETRON 4 MG/1
4 TABLET, ORALLY DISINTEGRATING ORAL
Qty: 5 TABLET | Refills: 1 | Status: SHIPPED | OUTPATIENT
Start: 2021-12-28

## 2021-12-28 RX ORDER — SODIUM CHLORIDE, SODIUM LACTATE, POTASSIUM CHLORIDE, CALCIUM CHLORIDE 600; 310; 30; 20 MG/100ML; MG/100ML; MG/100ML; MG/100ML
1000 INJECTION, SOLUTION INTRAVENOUS CONTINUOUS
Status: DISCONTINUED | OUTPATIENT
Start: 2021-12-28 | End: 2021-12-28 | Stop reason: HOSPADM

## 2021-12-28 RX ORDER — KETAMINE HCL IN 0.9 % NACL 50 MG/5 ML
SYRINGE (ML) INTRAVENOUS AS NEEDED
Status: DISCONTINUED | OUTPATIENT
Start: 2021-12-28 | End: 2021-12-28 | Stop reason: HOSPADM

## 2021-12-28 RX ORDER — GLYCOPYRROLATE 0.2 MG/ML
INJECTION INTRAMUSCULAR; INTRAVENOUS AS NEEDED
Status: DISCONTINUED | OUTPATIENT
Start: 2021-12-28 | End: 2021-12-28 | Stop reason: HOSPADM

## 2021-12-28 RX ORDER — ROCURONIUM BROMIDE 10 MG/ML
INJECTION, SOLUTION INTRAVENOUS AS NEEDED
Status: DISCONTINUED | OUTPATIENT
Start: 2021-12-28 | End: 2021-12-28 | Stop reason: HOSPADM

## 2021-12-28 RX ADMIN — Medication 10 ML: at 14:11

## 2021-12-28 RX ADMIN — Medication 25 MG: at 10:43

## 2021-12-28 RX ADMIN — ACETAMINOPHEN 1000 MG: 500 TABLET ORAL at 10:13

## 2021-12-28 RX ADMIN — LIDOCAINE HYDROCHLORIDE 60 MG: 20 INJECTION, SOLUTION EPIDURAL; INFILTRATION; INTRACAUDAL; PERINEURAL at 10:43

## 2021-12-28 RX ADMIN — CEFAZOLIN 2 G: 330 INJECTION, POWDER, FOR SOLUTION INTRAMUSCULAR; INTRAVENOUS at 10:51

## 2021-12-28 RX ADMIN — MIDAZOLAM 2 MG: 1 INJECTION INTRAMUSCULAR; INTRAVENOUS at 10:33

## 2021-12-28 RX ADMIN — ONDANSETRON HYDROCHLORIDE 4 MG: 2 INJECTION, SOLUTION INTRAMUSCULAR; INTRAVENOUS at 10:50

## 2021-12-28 RX ADMIN — GLYCOPYRROLATE 0.4 MG: 0.2 INJECTION, SOLUTION INTRAMUSCULAR; INTRAVENOUS at 11:38

## 2021-12-28 RX ADMIN — ROCURONIUM BROMIDE 10 MG: 10 SOLUTION INTRAVENOUS at 10:43

## 2021-12-28 RX ADMIN — HYDROMORPHONE HYDROCHLORIDE 0.5 MG: 2 INJECTION, SOLUTION INTRAMUSCULAR; INTRAVENOUS; SUBCUTANEOUS at 11:21

## 2021-12-28 RX ADMIN — ONDANSETRON HYDROCHLORIDE 4 MG: 2 SOLUTION INTRAMUSCULAR; INTRAVENOUS at 14:07

## 2021-12-28 RX ADMIN — PROPOFOL 200 MG: 10 INJECTION, EMULSION INTRAVENOUS at 10:43

## 2021-12-28 RX ADMIN — ROCURONIUM BROMIDE 40 MG: 10 SOLUTION INTRAVENOUS at 10:46

## 2021-12-28 RX ADMIN — FENTANYL CITRATE 100 MCG: 50 INJECTION, SOLUTION INTRAMUSCULAR; INTRAVENOUS at 10:43

## 2021-12-28 RX ADMIN — DEXAMETHASONE SODIUM PHOSPHATE 8 MG: 4 INJECTION, SOLUTION INTRAMUSCULAR; INTRAVENOUS at 10:50

## 2021-12-28 RX ADMIN — SODIUM CHLORIDE, POTASSIUM CHLORIDE, SODIUM LACTATE AND CALCIUM CHLORIDE: 600; 310; 30; 20 INJECTION, SOLUTION INTRAVENOUS at 10:33

## 2021-12-28 RX ADMIN — SUCCINYLCHOLINE CHLORIDE 160 MG: 20 INJECTION, SOLUTION INTRAMUSCULAR; INTRAVENOUS at 10:46

## 2021-12-28 RX ADMIN — NEOSTIGMINE METHYLSULFATE 3 MG: 1 INJECTION, SOLUTION INTRAVENOUS at 11:38

## 2021-12-28 NOTE — H&P
History and Physical    HISTORY OF PRESENT ILLNESS  Michael Mahan is a 49 y.o. female. HPI  NEW patient consult referred by  Dr. Sun Avila for RIGHT breast micro invasive carcinoma and DCIS.       Breast cancer -  11/12/21 RIGHT breast biopsy - micro invasive, gr 1, %, DC 5%, HER2 NEG, KI67 1%, AND DCIS gr 1, %, DC 40%     Family History: Mother, breast and bladder cancer, dx at age 77 LEFT breast         Mammogram, 11/04/21, [facility] 4  BREAST SONOGRAPHY of the right breast shows an ill-defined 5 mm hypoechoic focus  with posterior shadowing, 8:00 location approximately 3 cm from the nipple  No past medical history on file. No past surgical history on file. Social History            Socioeconomic History    Marital status:        Spouse name: Not on file    Number of children: Not on file    Years of education: Not on file    Highest education level: Not on file   Occupational History    Not on file   Tobacco Use    Smoking status: Never Smoker    Smokeless tobacco: Never Used   Substance and Sexual Activity    Alcohol use: Not Currently    Drug use: Never    Sexual activity: Not on file   Other Topics Concern    Not on file   Social History Narrative    Not on file      Social Determinants of Health          Financial Resource Strain:     Difficulty of Paying Living Expenses: Not on file   Food Insecurity:     Worried About Running Out of Food in the Last Year: Not on file    Marty of Food in the Last Year: Not on file   Transportation Needs:     Lack of Transportation (Medical): Not on file    Lack of Transportation (Non-Medical):  Not on file   Physical Activity:     Days of Exercise per Week: Not on file    Minutes of Exercise per Session: Not on file   Stress:     Feeling of Stress : Not on file   Social Connections:     Frequency of Communication with Friends and Family: Not on file    Frequency of Social Gatherings with Friends and Family: Not on file    Attends [employer] Services: Not on file    Active Member of Clubs or Organizations: Not on file    Attends Club or Organization Meetings: Not on file    Marital Status: Not on file   Intimate Partner Violence:     Fear of Current or Ex-Partner: Not on file    Emotionally Abused: Not on file    Physically Abused: Not on file    Sexually Abused: Not on file   Housing Stability:     Unable to Pay for Housing in the Last Year: Not on file    Number of [place] in the Last Year: Not on file    Unstable Housing in the Last Year: Not on file      OB History    No obstetric history on file.       Obstetric Comments   Menarche 8, LMP AT ag 38, # of children 2, age of 4st delivery 47, Hysterectomy/oophorectomy no/no, Breast bx no, history of breast feeding shirley, BCP yes, Hormone therapy no                Current Outpatient Medications:     venlafaxine-SR (Effexor XR) 75 mg capsule, Take  by mouth daily. , Disp: , Rfl:   No current facility-administered medications for this visit.     Facility-Administered Medications Ordered in Other Visits:     gadoteridoL (PROHANCE) 279.3 mg/mL contrast solution, , , ,   No Known Allergies     Review of Systems   HENT: Positive for congestion. All other systems reviewed and are negative.        Physical Exam  Vitals and nursing note reviewed. Chest:   Breasts: Breasts are symmetrical.      Right: No inverted nipple, mass, nipple discharge, skin change, tenderness, axillary adenopathy or supraclavicular adenopathy. Left: No inverted nipple, mass, nipple discharge, skin change, tenderness, axillary adenopathy or supraclavicular adenopathy.         Lymphadenopathy:      Cervical: No cervical adenopathy. Upper Body:      Right upper body: No supraclavicular, axillary or pectoral adenopathy. Left upper body: No supraclavicular, axillary or pectoral adenopathy.       BREAST ULTRASOUND, Preop planning  Indication:preop planning  right Breast 8:00    Technique:   The area was scanned using a high-frequency linear-array near-field transducer  Findings: 5 mm irregular mass with dropout and clip   Impression: Biopsy site visible with ultrasound  Disposition:  Will schedule lumpectomy with sentinel lymph node biopsy  After mri   ASSESSMENT and PLAN      ICD-10-CM ICD-9-CM     1. Malignant neoplasm of lower-outer quadrant of right breast of female, estrogen receptor positive ([facility] [address]  C50.511 174. 5       Z17.0 V86.0     48 yo female with right breast T1 mi N0 gr 1, %, AZ 5%, HER2 NEG, KI67 1%, AND DCIS gr 1, %, AZ 40%  She is here alone  I have reviewed the imaging and pathology with her and she was given copies of these reports.     90 minutes were spent face-to-face with the patient during this encounter and 90% of that time was spent on counseling and coordination of care. 1. Discussed lumpectomy and radiation vs mastectomy. Discussed reconstruction. MRI ordered to see if patient is a candidate for a lumpectomy. 2. Discussed sentinel lymph node biopsy. 3. Discussed external beam radiation. 4. Discussed hormone therapy.   5. Discussed the possibility of chemotherapy.      Will call her after mri  Send genetic testing  Plan is right us guided lumpectomy, sln biopsy

## 2021-12-28 NOTE — ANESTHESIA PREPROCEDURE EVALUATION
Relevant Problems   No relevant active problems       Anesthetic History   No history of anesthetic complications            Review of Systems / Medical History  Patient summary reviewed, nursing notes reviewed and pertinent labs reviewed    Pulmonary  Within defined limits                 Neuro/Psych   Within defined limits           Cardiovascular                  Exercise tolerance: >4 METS     GI/Hepatic/Renal                Endo/Other             Other Findings              Physical Exam    Airway  Mallampati: I  TM Distance: > 6 cm  Neck ROM: normal range of motion   Mouth opening: Normal     Cardiovascular    Rhythm: regular           Dental  No notable dental hx       Pulmonary  Breath sounds clear to auscultation               Abdominal         Other Findings            Anesthetic Plan    ASA: 2  Anesthesia type: general          Induction: Intravenous  Anesthetic plan and risks discussed with: Patient

## 2021-12-28 NOTE — OP NOTES
[facility]  OPERATIVE REPORT    Name:  Cheri Lim  MR#:  [id number]  :  1965  ACCOUNT #:  [de-identified]  DATE OF SERVICE:  2021      PREOPERATIVE DIAGNOSIS:  Right breast cancer, lower outer quadrant. POSTOPERATIVE DIAGNOSIS:  Right breast cancer, lower outer quadrant. PROCEDURE PERFORMED:  Right breast ultrasound-guided lumpectomy, right deep axillary sentinel node biopsy, intraoperative margin assessment using RF spectroscopy. SURGEON:  Martina Mays MD    ASSISTANT:  Kai Munoz SA    ANESTHESIA:  General.    COMPLICATIONS:  None. SPECIMENS REMOVED:  1. Right axillary sentinel node #1.  2.  Lumpectomy. IMPLANTS:  None. ESTIMATED BLOOD LOSS:  Minimal.    DRAINS:  None. FINDINGS:  [place] lymph nodes, sent for permanent. INDICATION FOR PROCEDURE:  A 44-year-old female with right breast cancer, lower outer quadrant. She wished to have a lumpectomy and deep axillary sentinel node biopsy. PROCEDURE:  The patient was seen in the preoperative holding area where surgical site was marked by surgeon. Informed consent was obtained. Prior to this, she went to Nuclear Medicine for technetium injection in the right breast.  She tolerated this well. She was taken to the operating room and laid in supine position where LMA anesthesia was induced. Right breast was prepped and draped in usual fashion and time-out was performed. Attention was turned to the right axilla where an inferior axillary hairline incision was made with a 15-blade. Bovie cautery was used to dissect through the axillary fascia. One sentinel node was identified using Neoprobe guidance, excised with LigaSure and sent for permanent pathology. It was normal in gross size and appearance. Next, the cavity was irrigated. A mixture of 20 mL of Exparel with 20 mL of 0.25% Marcaine plain was mixed together and 20 mL of this was injected in the right axillary tissue and skin.   The axillary fascia was closed with interrupted 3-0 Vicryl and the skin with 4-0 subcuticular Monocryl. Attention was turned to the right breast at 8 o'clock where the lesion and clip were identified using ultrasound guidance. A radial incision was made with a 15-blade. Bovie cautery was used to dissect down around the lesion in the chest wall. This was excised and marked short superior and long stitch lateral.  The MarginProbe device was plugged in and calibrated. All six margins were assessed. For additional margins, please see above. Total intraoperative time was 2 minutes. Next, the breast lumpectomy cavity was irrigated. 20 mL of Exparel mixture was injected. A VeraForm suture was used to line the parameter of the lumpectomy bed. The deeper tissues were closed with interrupted 2-0 Vicryl and the skin with interrupted 3-0 Vicryl and 4-0 subcuticular Monocryl. Skin glue was placed on the incision as well as dressing and a bra. All sponge and instrument counts were correct. The patient went to Recovery in stable condition.         MD JULIANE [id number]  D:  12/28/2021 11:46  T:  12/28/2021 12:22  JOB #:  [id number]

## 2021-12-28 NOTE — BRIEF OP NOTE
Brief Postoperative Note    Patient: Lena Sero  YOB: 1965  MRN: [id number]    Date of Procedure: 12/28/2021     Pre-Op Diagnosis: RIGHT BREAST CANCER    Post-Op Diagnosis: Same as preoperative diagnosis. Procedure(s):  RIGHT BREAST LUMPECTOMY WITH ULTRASOUND  RIGHT AXILLARY SENTINEL NODE BIOPSY    Surgeon(s):   Karyna Meza MD    Surgical Assistant: Surg Asst-1: Miroslava DELGADO    Anesthesia: General     Estimated Blood Loss (mL): Minimal    Complications: None    Specimens:   ID Type Source Tests Collected by Time Destination   1 : RIGHT AXILLARY SENTINEL NODE Fresh Lymph Node  Karyna Meza MD 12/28/2021 1107 Pathology   2 : Right  Breast Lumpectomy Fresh Breast Mass,Right  Karyna Meza MD 12/28/2021 1132 Pathology        Implants: * No implants in log *    Drains: * No LDAs found *    Findings: sln sent for permanent    Electronically Signed by Branden Lopez MD on 12/28/2021 at 11:43 AM  Dictated stat

## 2021-12-28 NOTE — DISCHARGE INSTRUCTIONS
Discharge Instructions from Dr. Justin Brizuela    · I will call you with the pathology results, typically within 1 week from today. · You may shower, but no hot tubs, swimming pools, or baths until your incision is healed. · No heavy lifting with the affected extremity (nothing greater than 5 pounds), and limit its use for the next 4-5 days. · You may use an ice pack for comfort for the next couple of days, but do not place ice directly on the skin. Rather, use a towel or clothing to serve as a barrier between skin and ice to prevent injury. · If I placed a drain, follow the drain instructions provided, especially as you keep a record of the drain output. · Follow medication instructions carefully. · Wear surgical bra for 24 hours, then remove. Wear supportive bra at all times. · You will have bruising and swelling  · Watch for signs of infection as listed below. · Redness  · Swelling  · Drainage from the incision or from your nipple that appears infected  · Fever over 101.5 degrees for consecutive readings, or over 99.5 if you are currently undergoing chemotherapy. · Call our office (number is below) for a follow-up appointment. · If you have any problems, our phone number is [phone]    ______________________________________________________________________    Anesthesia Discharge Instructions    After general anesthesia or intervenous sedation, for 24 hours or while taking prescription Narcotics:  · Limit your activities  · Do not drive or operate hazardous machinery  · If you have not urinated within 8 hours after discharge, please contact your surgeon on call.   · Do not make important personal or business decisions  · Do not drink alcoholic beverages    Report the following to your surgeon:  · Excessive pain, swelling, redness or odor of or around the surgical area  · Temperature over 100.5 degrees  · Nausea and vomiting lasting longer than 4 hours or if unable to take medication  · Any signs of decreased circulation or nerve impairment to extremity:  Change in color, persistent numbness, tingling, coldness or increased pain.   · Any questions call Dr. La Rodriguez office

## 2021-12-28 NOTE — ANESTHESIA POSTPROCEDURE EVALUATION
Post-Anesthesia Evaluation and Assessment    Patient: Lurlean Galeazzi MRN: [id number]  SSN: [id number]    YOB: 1965  Age: 49 y.o. Sex: female      I have evaluated the patient and they are stable and ready for discharge from the PACU. Cardiovascular Function/Vital Signs  Visit Vitals  BP (!) 145/80   Pulse 75   Temp 36.5 °C (97.7 °F)   Resp 14   Ht 5' 5\" (1.651 m)   Wt 76.2 kg (168 lb)   SpO2 94%   BMI 27.96 kg/m²       Patient is status post General anesthesia for Procedure(s):  RIGHT BREAST LUMPECTOMY WITH ULTRASOUND  RIGHT AXILLARY SENTINEL NODE BIOPSY. Nausea/Vomiting: None    Postoperative hydration reviewed and adequate. Pain:  Pain Scale 1: Numeric (0 - 10) (12/28/21 1300)  Pain Intensity 1: 0 (12/28/21 1300)   Managed    Neurological Status:   Neuro (WDL): Exceptions to WDL (12/28/21 1300)  Neuro  Neurologic State: Sleeping;Eyes open to voice (12/28/21 1300)  LUE Motor Response: Purposeful (12/28/21 1300)  LLE Motor Response: Purposeful (12/28/21 1300)  RUE Motor Response: Purposeful (12/28/21 1300)  RLE Motor Response: Purposeful (12/28/21 1300)   At baseline    Mental Status, Level of Consciousness: Alert and  oriented to person, place, and time    Pulmonary Status:   O2 Device: Nasal cannula (12/28/21 1300)   Adequate oxygenation and airway patent    Complications related to anesthesia: None    Post-anesthesia assessment completed. No concerns    Signed By: Jaxon Suh MD     December 28, 2021              Procedure(s):  RIGHT BREAST LUMPECTOMY WITH ULTRASOUND  RIGHT AXILLARY SENTINEL NODE BIOPSY. general    <BSHSIANPOST>    INITIAL Post-op Vital signs:   Vitals Value Taken Time   /81 12/28/21 1345   Temp 36.5 °C (97.7 °F) 12/28/21 1300   Pulse 86 12/28/21 1346   Resp 21 12/28/21 1346   SpO2 93 % 12/28/21 1346   Vitals shown include unvalidated device data.

## 2021-12-29 ENCOUNTER — TELEPHONE (OUTPATIENT)
Dept: SURGERY | Age: 56
End: 2021-12-29

## 2021-12-29 NOTE — TELEPHONE ENCOUNTER
Called patient to check on her after her breast surgery. She is doing well and has no questions or concerns.

## 2022-01-05 ENCOUNTER — DOCUMENTATION ONLY (OUTPATIENT)
Dept: SURGERY | Age: 57
End: 2022-01-05

## 2022-01-05 NOTE — PROGRESS NOTES
Called surg path  Stage 1 microinvasion  Refer to med onc rad onc  No chemo  Will need xrt and pill  She was happy

## 2022-01-07 ENCOUNTER — TELEPHONE (OUTPATIENT)
Dept: ONCOLOGY | Age: 57
End: 2022-01-07

## 2022-01-10 ENCOUNTER — OFFICE VISIT (OUTPATIENT)
Dept: SURGERY | Age: 57
End: 2022-01-10
Payer: COMMERCIAL

## 2022-01-10 DIAGNOSIS — Z98.890 S/P LUMPECTOMY, RIGHT BREAST: Primary | ICD-10-CM

## 2022-01-10 PROCEDURE — 99024 POSTOP FOLLOW-UP VISIT: CPT | Performed by: SURGERY

## 2022-01-10 NOTE — PROGRESS NOTES
HISTORY OF PRESENT ILLNESS  Ayana Pierre is a 49 y.o. female. 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 Pain where the incision is and under top of right arm. .numbness na  11/12/21 RIGHT breast biopsy - micro invasive, gr 1, %, AK 5%, HER2 NEG, KI67 1%, AND DCIS gr 1, %, AK 40%     Family History: Mother, breast and bladder cancer, dx at age 77 LEFT breast         Mammogram, 11/04/21, [facility] 4  BREAST SONOGRAPHY of the right breast shows an ill-defined 5 mm hypoechoic focus  with posterior shadowing, 8:00 location approximately 3 cm from the nipple  No past medical history on file. No past surgical history on file. Review of Systems   All other systems reviewed and are negative. Physical Exam  Vitals and nursing note reviewed. Chest:      Comments: Right breast incisions healing well         ASSESSMENT and PLAN    ICD-10-CM ICD-9-CM    1.  S/P lumpectomy, right breast  Z98.890 V45.89      Healing well  Refer to rad onc med onc  F/u with np 4-6 months

## 2022-01-14 ENCOUNTER — DOCUMENTATION ONLY (OUTPATIENT)
Dept: ONCOLOGY | Age: 57
End: 2022-01-14

## 2022-01-14 ENCOUNTER — OFFICE VISIT (OUTPATIENT)
Dept: ONCOLOGY | Age: 57
End: 2022-01-14
Payer: COMMERCIAL

## 2022-01-14 VITALS
DIASTOLIC BLOOD PRESSURE: 72 MMHG | RESPIRATION RATE: 18 BRPM | BODY MASS INDEX: 28.12 KG/M2 | SYSTOLIC BLOOD PRESSURE: 115 MMHG | OXYGEN SATURATION: 96 % | WEIGHT: 169 LBS | TEMPERATURE: 97.1 F | HEART RATE: 86 BPM

## 2022-01-14 DIAGNOSIS — C50.911 MALIGNANT NEOPLASM OF RIGHT BREAST IN FEMALE, ESTROGEN RECEPTOR POSITIVE, UNSPECIFIED SITE OF BREAST (HCC): Primary | ICD-10-CM

## 2022-01-14 DIAGNOSIS — E88.09 AROMATASE DEFICIENCY: ICD-10-CM

## 2022-01-14 DIAGNOSIS — Z17.0 MALIGNANT NEOPLASM OF RIGHT BREAST IN FEMALE, ESTROGEN RECEPTOR POSITIVE, UNSPECIFIED SITE OF BREAST (HCC): Primary | ICD-10-CM

## 2022-01-14 PROCEDURE — 99244 OFF/OP CNSLTJ NEW/EST MOD 40: CPT | Performed by: INTERNAL MEDICINE

## 2022-01-14 RX ORDER — ANASTROZOLE 1 MG/1
1 TABLET ORAL DAILY
Qty: 30 TABLET | Refills: 5 | Status: SHIPPED | OUTPATIENT
Start: 2022-02-16 | End: 2022-05-23 | Stop reason: SDUPTHER

## 2022-01-14 NOTE — PROGRESS NOTES
Treating Pressure Ulcers: Cleaning and Dressing  Its important that pressure ulcers be kept clean, moist, and covered. This helps reduce the risk of infection and speeds up the healing process. To promote healing, clean pressure ulcers at each dressing change. Take care to choose the most appropriate type of cleanser and dressing.    Caution  · Do not use heat lamps or drying agents, such as alcohol. They dry out wounds and can kill fragile new tissue.  · Do not use antiseptic agents, such as povidone-iodine and hydrogen peroxide, because they are toxic to new cells.  · Be aware that if dressings become dry, they may fuse with new cells, causing loss of new tissue when dressings are removed. Remove or change dressings before they dry out.  · Silver-based dressings are very effective for killing bacteria, and they are safe for newly developing tissues.   Wound irrigation  An irrigating catheter or syringe and saline may be used to flush the ulcer free of debris. Wound cleansers may also be used to loosen up and clean out debris. The amount of pressure used during irrigation should be enough to clean the wound without damaging it. Follow your facilitys guidelines regarding irrigation.  Adding moisture  Maintaining a clean, moist wound bed is essential for promoting healing. Certain dressings help keep ulcers moist. Be sure to fill spaces loosely with dressings to prevent fluid and bacteria from building up. Hydrogels can also help retain moisture.  Types of dressings  Many kinds of dressings are available. Be sure to follow the s instructions for the specific dressing used. If a wound doesnt respond to one type of dressing, consider changing the treatment plan.  · Moist gauze helps keep the wound moist and absorbs excess fluid. Gauze should be damp--not wet--with saline. Too-wet gauze can weaken surrounding tissue.  · Transparent films are thin and flexible and help protect wounds from water and  [facility] [place] at [address]  [address], [address]  W: [phone]  F: [phone]    Reason for Visit:   Zaynab Henriquez is a 49 y.o. female who is seen in consultation at the request of Dr. Otf Ballard for evaluation of R breast microinvasive carcinoma and DCIS    Treatment History:   · 12/28/2021: R breast lumpectomy / SLN    History of Present Illness:   Patient is a 49 y.o. female seen for above . Had a routine Mammogram 11/4/2021 showed a 5 mm R breast lesion. Biopsy showed micro invasive, gr 1, %, VT 5%, HER2 NEG, KI67 1%, AND DCIS gr 1, %, VT 40%. Had a breast MRI that the lesion was 8 mm with no nodes. She is seen after a lumpectomy and SLN. She goes by Broota. She is well. She has had not had a period in \" years\". She has had no cough, HA, sob, lumps, diarrhea. She has a h/o TIA  Is on Effexor for depression    Mother had breast cancer in her 76s. She had bladder and skin cancer too. No other FH of cancers     She has  A 13 and 15 y/o old.     Past Medical History:   Diagnosis Date    Cancer [facility]) 2021    R BREAST      Past Surgical History:   Procedure Laterality Date    HX BREAST LUMPECTOMY Right 12/28/2021    RIGHT BREAST LUMPECTOMY WITH ULTRASOUND performed by Jessica Sequeira MD at [facility] [place] [facility] OR     [address]      X 2    HX OOPHORECTOMY Left UNSURE    VT BREAST SURGERY PROCEDURE UNLISTED Right 2021      Social History     Tobacco Use    Smoking status: Never Smoker    Smokeless tobacco: Never Used   Substance Use Topics    Alcohol use: Not Currently     Comment: SOCIAL OCCASIONS      Family History   Problem Relation Age of Onset    Breast Cancer Mother 72    Cancer Mother         BLADDER AND BREAST    Heart Disease Father     No Known Problems Sister     Anesth Problems Neg Hx      Current Outpatient Medications   Medication Sig    ondansetron (ZOFRAN ODT) 4 mg disintegrating tablet Take 1 Tablet by mouth every bacteria.   · Hydrocolloids absorb exudate, forming a nonadhesive gel. This helps maintain a moist wound environment. Hydrocolloids also protect the wound from water and bacteria.  · Hydrogels are water-based gels and dressing sheets that keep wounds moist. They are also soothing and can help ease pain.  · Alginates are highly absorptive dressings made from seaweed. When combined with wound exudate the dressing may form a gel that helps maintain a moist wound bed.  · Foams absorb exudate and keep the wound moist. They are used to cover or fill wounds.  · Collagens absorb exudate and help maintain a moist wound environment. They may also promote new tissue growth.  · Antimicrobials help prevent and treat infection. These dressings come in many forms.  Negative pressure wound therapy  Negative pressure wound therapy (NPWT)--also called vacuum-assisted closure--removes exudate, helps reduce bacterial growth, and promotes blood flow and granulation formation. First, a foam dressing is placed in the wound and the wound is covered with an occlusive dressing. Then tubing is attached to a pump, which creates subatmospheric pressure in the wound. Keep in mind that patients may require analgesia as dressing changes can be painful.  Date Last Reviewed: 1/24/2016  © 0558-2164 [employer], "[employer]. [address] [place] [address], [place] [address]. All rights reserved. This information is not intended as a substitute for professional medical care. Always follow your healthcare professional's instructions.         eight (8) hours as needed for Nausea or Vomiting.  venlafaxine-SR (Effexor XR) 75 mg capsule Take  by mouth daily. No current facility-administered medications for this visit. Allergies   Allergen Reactions    Adhesive Other (comments)     BLISTERS ON SKIN        Review of Systems: A complete review of systems was obtained, negative except as described above. Physical Exam:     Visit Vitals  /72 (BP 1 Location: Right arm, BP Patient Position: Sitting)   Pulse 86   Temp 97.1 °F (36.2 °C)   Resp 18   Wt 169 lb (76.7 kg)   SpO2 96%   BMI 28.12 kg/m²     ECOG PS: 0  General: No distress  Eyes: PERRLA, anicteric sclerae  HENT: Atraumatic  CV: Normal rate, no peripheral edema  GI: Soft, nontender, nondistended  MS: Normal gait and station. Digits without clubbing or cyanosis. Skin: No rashes, ecchymoses, or petechiae. Normal temperature, turgor, and texture. Psych: Alert, oriented, appropriate affect, normal judgment/insight    Results:     Lab Results   Component Value Date/Time    WBC 10.4 12/17/2021 10:30 AM    HGB 14.2 12/17/2021 10:30 AM    HCT 42.8 12/17/2021 10:30 AM    PLATELET 034 49/30/6996 10:30 AM    MCV 90.1 12/17/2021 10:30 AM    ABS. NEUTROPHILS 6.0 12/17/2021 10:30 AM     Lab Results   Component Value Date/Time    Sodium 137 11/18/2009 08:40 AM    Potassium 3.7 11/18/2009 08:40 AM    Chloride 99 11/18/2009 08:40 AM    CO2 27 11/18/2009 08:40 AM    Glucose 125 (H) 11/18/2009 08:40 AM    BUN 10 11/18/2009 08:40 AM    Creatinine 0.7 11/18/2009 08:40 AM    GFR est AA >60 11/18/2009 08:40 AM    GFR est non-AA >60 11/18/2009 08:40 AM    Calcium 9.5 11/18/2009 08:40 AM     Lab Results   Component Value Date/Time    Bilirubin, total 0.5 11/18/2009 08:40 AM    ALT (SGPT) 35 11/18/2009 08:40 AM    Alk.  phosphatase 73 11/18/2009 08:40 AM    Protein, total 8.7 (H) 11/18/2009 08:40 AM    Albumin 5.1 (H) 11/18/2009 08:40 AM    Globulin 3.6 11/18/2009 08:40 AM         Records reviewed and summarized above. Pathology report(s) reviewed   12/28/2021  1. Roll lymph node, right axilla, excision:        One lymph node, negative for metastatic carcinoma (0/1)     2. Right breast, lumpectomy:        Ductal carcinoma in situ, grade 1, with focal microinvasion   Previous biopsy site with clip   Margins are uninvolved by in situ or invasive carcinoma   ER/CO positive, HER-2/renetta negative per previous report ([id number])   Fibrocystic changes with usual ductal hyperplasia and sclerosing adenosis   See comment     INVASIVE CARCINOMA OF THE BREAST: Resection    SPECIMEN       Procedure: Excision (less than total mastectomy)       Specimen Laterality: Right    TUMOR       Histologic Type: Micro-invasive carcinoma       Glandular (Acinar) / Tubular Differentiation: Only microinvasion         present (not graded)       Nuclear Pleomorphism: Only microinvasion present (not graded)       Mitotic Rate: Only microinvasion present (not graded)       Overall Grade:  Only microinvasion present (not graded)       Tumor Size: Microinvasion only (<= 1 mm)       Ductal Carcinoma In Situ (DCIS): Present           Ductal Carcinoma In Situ (DCIS): Positive for extensive             intraductal component (EIC)           Size (Extent) of DCIS: 10 mm           Architectural Patterns: Cribriform           Nuclear Grade: Grade I (low)           Necrosis: Not identified    Tumor Extent       Skin: Skin is not present       Skeletal Muscle: Skeletal muscle is not present       Lymphovascular Invasion: Not identified       Treatment Effect in the Breast: No known presurgical therapy    MARGINS       Invasive Carcinoma Margins: Uninvolved by invasive carcinoma           Distance from Closest Margin (Millimeters): Greater than: 7 mm       DCIS Margins: Uninvolved by DCIS           Distance from Closest Margin (Millimeters): 6 mm           Closest Margin(s): Inferior    LYMPH NODES       Regional Lymph Nodes: Uninvolved by tumor cells         Total Number of Lymph Nodes Examined: 1           Number of [place] Nodes Examined: 1      PATHOLOGIC STAGE CLASSIFICATION (pTNM, AJCC 8th Edition)       Primary Tumor (pT): pT1mi       Regional Lymph Nodes Modifier: (sn): [place] node(s) evaluated       Regional Lymph Nodes (pN): pN0       Radiology report(s) reviewed above. MRI 11/2021       Right Breast:  1. BI-RADS Assessment Category 6: Known biopsy proven malignancy- Appropriate  action should be taken. 8 mm mass and associated biopsy clip in the middle third  of the right breast at 8:00, consistent with newly diagnosed breast carcinoma. 2. No evidence of multifocal or multicentric malignancy. 3. No suspicious lymphadenopathy.     Left Breast:  1. BI-RADS Assessment Category 2: Benign finding. Diffuse scattered enhancing  foci. 2. No evidence of breast carcinoma within the left breast.       Assessment:   1) R breast microinvasive breast cancer with DCIS    S/P Lumpectomy and SLN on 12/28/2021  10 mm DCIS, Microinvasion+, margins negative ( 6-7 mm), 1 negative node  %AL 5% HER2 renetta negative  Grade 1    [id number] Stage IA    No high risk features noted on pathology. Microinvasive breast carcinoma has an excellent prognosis, with a five-year overall survival between 97 and 100 percent   Hence the role of adjuvant endocrine therapy in this context is similar to that for DCIS which is chemoprevention/ which is to reduce the risk of recurrent breast events ( invasive and non invasive ) in both breasts. We discussed data on adjuvant endocrine therapy in the context of DCIS. She is meeting with Dr. Osvaldo Nuñez to discuss adjuvant RT   Addition of endocrine therapy in HR+ DCIS further decreases the risk of recurrent breast events by about 30-40% (including contralateral breast events).      NSABP B-35 which compared 5 years of Tamoxifen versus that of Anastrazole in postmenopausal women following BCT for DCIS showed that compared to Tamoxifen, Anastrazole reduced the incidence of breast events by ~ 30% and of invasive breast cancer by about 40%. The benefits were observed in women < 60 y/o . It was reiterated that neither radiation nor Endocrine treatments  improve OS. Anastrazole may lead to myalgias, arthralgias, osteoporosis / fractures and cardiovascular disease. 2) h/o TIA    3) Depression  Effexor    4) Psychosocial  Coping well  Met with the SW    Plan:     · DEXA  · VD and CA  · Radiation  · Start Arimidex 1 week after RT is finished     RTC 3 months     I appreciate the opportunity to participate in Ms. Sage Álvarez dennise.     Signed By: Luz Carlos MD

## 2022-01-14 NOTE — PROGRESS NOTES
Oncology Social Work  Psychosocial Assessment    Reason for Assessment:      [] Social Work Referral [x] Initial Assessment [] New Diagnosis [] Other    Advance Care Planning:  Advance Care Planning 12/17/2021   Confirm Advance Directive None   Patient Would Like to Complete Advance Directive No   Does the patient have other document types Organ Directive   Patient does not have an advanced medical directive, and did not express interest in completing one today. Sources of Information:    [x]Patient  []Family  []Staff  []Medical Record      Mental Status:    [x]Alert  []Lethargic  []Unresponsive   [] Unable to assess   Oriented to:  [x]Person  [x]Place  [x]Time  [x]Situation      Relationship Status:  []Single  [x]  []Significant Other/Life Partner  []  []  []    Living Circumstances:  []Lives Alone  [x]Family/Significant Other in Household  []Roommates  []Children in the Home  []Paid Caregivers  []Assisted Living Facility/Group Home  []Skilled [address]  []Homeless  []Incarcerated  []Environmental/Care Concerns  []Other:    Employment Status:  []Employed Full-time []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 [x] Yes [] No  Is the patient involved in a spiritual, keo or Sabianist community? [] Yes [] No  Patient expressing spiritual/existential angst? [] Yes [x] No  Notes: Patient identifies as Dianelys 5. Support System:    Identified Support Person/Group:  [x]Strong  []Fair  []Limited    Coping with Illness:   [x]  Coping Well  [] Challenges Coping with Serious Illness [] Situational Depression [] Situational Anxiety [] Anticipatory Grief  [] Recent Loss [] Caregiver [place]            Narrative:   Met with the patient, \"Zora,\" during her initial office visit. Patient is being seen for R breast microinvasive carcinoma and DCIS. Patient is status post R breast lumpectomy / SLN on 12/28/2021. The patient has a PCP - Dr. Adama Altamirano. Living Situation -  The patient lives with her , and two children, in their home in [place], [place]. She shared that she has two biological daughters Kayla Witt, 12 yo, and Guanaco 17 yo), but one daughter now identifies as male (Stefania Jean-Baptiste). She also has 5 cats. The patient does not have a close relationship with her . Employment Status - The patient is a homemaker. She cares for her teenage children and her elderly parents (ages [de-identified] and 79 yo). Insurance - [facility]    Social Support - The patient has support from her children and parents. Resources provided -  Invited the patient to the upcoming Virtual Support Group meetings, led by this . Provided this 's contact information as well as information regarding the complementary services provided by the [facility], explaining that the center is currently closed due to COVID-19 restrictions. Explained that meditation, yoga, relationship counseling, and music therapy, are being offered virtually. Plan:   1. Introduced self and role of this  in the [address] [place] [address] Dr. 2.  Informed the patient of the [facility] and available resources there. 3.  Continue to meet with the patient when she returns to the clinic for ongoing assessment of the patients adjustment to her diagnosis and treatment.     4.  Ongoing psychosocial support as desired by patient.       Referral/Handouts:   Complementary therapies referral  Vicente Pacheco LCSW

## 2022-01-14 NOTE — PATIENT INSTRUCTIONS
Anastrozole  (xz-suy-cutn-zohl)  Trade/other name(s): Arimidex  Why would this drug be used? Anastrozole is used to treat breast cancer in women who have already gone through menopause and no longer have menstrual periods (postmenopausal women.)  How does this drug work? Anastrozole belongs to a group of drugs called aromatase inhibitors. It helps keep the body from making estrogen without affecting other hormones. Breast cancers that need estrogen to grow may stop growing or decrease in size. Before taking this medicine  Tell your doctor  If you are allergic to anything, including medicines, dyes, additives, or foods. If you have any serious medical conditions, such as high cholesterol, heart disease, or liver disease (including cirrhosis)  If you are pregnant, trying to get pregnant, or if there is any chance of pregnancy. This drug may cause birth defects if either the male or female is taking it at the time of conception or during pregnancy. Check with your doctor about what kinds of birth control can be used with this medicine. If you are breast-feeding. It is not known whether this drug passes into breast milk. If it does, it could harm the baby. If you think you might want to have children in the future. This drug has only been tested in women who have gone through menopause, and it may reduce fertility in those who have not. Talk with your doctor about the possible risk with this drug and options that may preserve your ability to have children. About any other prescription or over-the-counter medicines you are taking, including vitamins and herbs. In fact, keeping a written list of each of these medicines (including the doses of each and when you take them) with you in case of emergency may help prevent complications if you get sick. Interactions with other drugs  Medicines that contain estrogen may reduce the action of anastrozole. Tamoxifen may also reduce its effects.    Check with your doctor, nurse, or pharmacist about other medicines, vitamins, herbs, and supplements, and whether alcohol can cause problems with this medicine. Interactions with foods  No serious interactions with food are known at this time. Check with your doctor, nurse, or pharmacist about whether foods may be a problem. Tell all the doctors, dentists, nurses, and pharmacists you visit that you are taking this drug. How is this drug taken or given? Anastrozole is a pill and should be taken once a day, about the same time each day. It can be taken with or without food. The dose is the same for all adults. Take this drug exactly as your doctor tells you to. If you do not understand the instructions, your doctor or nurse can explain them to you. Store the medicine in a tightly closed container and away from children and pets. Precautions  It is important to keep taking this drug, even if you feel well. If you are bothered by side effects, talk to your doctor or nurse to find out if the problems are serious. Many side effects can be managed with help from your doctor. Very rarely, this drug may cause blood clots. This can take the form of clots in arms or legs (deep vein thrombosis), heart attack, stroke, or a blockage in the lungs. Call your doctor or nurse right away if you notice pain in your lower leg (calf), redness or swelling of your arm or leg, shortness of breath, chest pain, or trouble speaking or moving. Possible side effects  You will probably not have most of the following side effects, but if you have any talk to your doctor or nurse. They can help you understand the side effects and cope with them.    Common  weakness   lower energy level   Less common  headache   nausea   mild diarrhea   increased or decreased appetite   sweating   hot flashes   vaginal dryness   pain in bones and joints  increased osteoporosis (thinning bones)  higher risk of broken bones (fracture)  thinning hair  mood disturbances  Rare  blood clots with redness or mild swelling of arms, legs and ankles, pain in leg or calf, shortness of breath, pain in the chest, trouble moving or speaking*   allergic reaction with itchy welts, swelling mouth or throat, and trouble breathing  *See \"Precautions\" section for more detailed information. There are other side effects not listed above that can also occur in some patients. Tell your doctor or nurse if you develop these or any other problems. FDA approval  Yes  first approved in 1995   Disclaimer: This information does not cover all possible uses, actions, precautions, side effects, or interactions. It is not intended as medical advice, and should not be relied upon as a substitute for talking with your doctor, who is familiar with your medical needs.    Last Medical Review: 12/02/2009  Last Revised: 12/02/2009

## 2022-02-11 ENCOUNTER — HOSPITAL ENCOUNTER (OUTPATIENT)
Dept: MAMMOGRAPHY | Age: 57
Discharge: HOME | End: 2022-02-11
Attending: INTERNAL MEDICINE
Payer: COMMERCIAL

## 2022-02-11 DIAGNOSIS — E88.09 AROMATASE DEFICIENCY: ICD-10-CM

## 2022-02-11 PROCEDURE — 77080 DXA BONE DENSITY AXIAL: CPT

## 2022-03-19 PROBLEM — C50.911 MALIGNANT NEOPLASM OF RIGHT BREAST IN FEMALE, ESTROGEN RECEPTOR POSITIVE (HCC): Status: ACTIVE | Noted: 2022-01-14

## 2022-05-17 ENCOUNTER — OFFICE VISIT (OUTPATIENT)
Dept: ONCOLOGY | Age: 57
End: 2022-05-17
Payer: COMMERCIAL

## 2022-05-17 VITALS
WEIGHT: 173 LBS | SYSTOLIC BLOOD PRESSURE: 141 MMHG | HEART RATE: 93 BPM | RESPIRATION RATE: 17 BRPM | OXYGEN SATURATION: 96 % | TEMPERATURE: 97.8 F | DIASTOLIC BLOOD PRESSURE: 83 MMHG | BODY MASS INDEX: 28.79 KG/M2

## 2022-05-17 DIAGNOSIS — Z17.0 MALIGNANT NEOPLASM OF RIGHT BREAST IN FEMALE, ESTROGEN RECEPTOR POSITIVE, UNSPECIFIED SITE OF BREAST (HCC): ICD-10-CM

## 2022-05-17 DIAGNOSIS — C50.911 MALIGNANT NEOPLASM OF RIGHT BREAST IN FEMALE, ESTROGEN RECEPTOR POSITIVE, UNSPECIFIED SITE OF BREAST (HCC): ICD-10-CM

## 2022-05-17 DIAGNOSIS — E88.09 AROMATASE DEFICIENCY: Primary | ICD-10-CM

## 2022-05-17 PROCEDURE — 99214 OFFICE O/P EST MOD 30 MIN: CPT | Performed by: INTERNAL MEDICINE

## 2022-05-17 NOTE — PROGRESS NOTES
Taiwo Downs is a 49 y.o. female    Chief Complaint   Patient presents with    Follow-up     R breast microinvasive carcinoma and DCIS     1. Have you been to the ER, urgent care clinic since your last visit? Hospitalized since your last visit? No    2. Have you seen or consulted any other health care providers outside of the [address] [place] [address] since your last visit? Include any pap smears or colon screening.  No

## 2022-05-17 NOTE — PROGRESS NOTES
[facility] [place] [address] [address], [address]  W: [phone]  F: [phone]    Reason for Visit:   Johanne Herrera is a 49 y.o. female who is seen for R breast microinvasive carcinoma and DCIS    Treatment History:   · 12/28/2021: R breast lumpectomy / SLN, received RT  ·     History of Present Illness:   Patient is a 49 y.o. female seen for above . Had a routine Mammogram 11/4/2021 showed a 5 mm R breast lesion. Biopsy showed micro invasive, gr 1, %, DC 5%, HER2 NEG, KI67 1%, AND DCIS gr 1, %, DC 40%. Had a breast MRI that the lesion was 8 mm with no nodes. Had a lumpectomy and SLN. She goes by [employer]. She tolerated RT well   Has lost her dad. Oldest is graduating high school  Estranged from   Has some sciatica, steroids helped tremendously. She has not started Arimidex. She has had no cough, HA, sob, lumps. She has a h/o TIA  Is on Effexor for depression    Mother had breast cancer in her 76s. She had bladder and skin cancer too. No other FH of cancers     She has  A 13 and 17 y/o old.     Past Medical History:   Diagnosis Date    Cancer [facility]) 2021    R BREAST      Past Surgical History:   Procedure Laterality Date    HX BREAST LUMPECTOMY Right 12/28/2021    RIGHT BREAST LUMPECTOMY WITH ULTRASOUND performed by Verna Pfeiffer MD at [facility] [place] [facility] OR     [facility]      X 2    HX OOPHORECTOMY Left UNSURE    DC BREAST SURGERY PROCEDURE UNLISTED Right 2021      Social History     Tobacco Use    Smoking status: Never Smoker    Smokeless tobacco: Never Used   Substance Use Topics    Alcohol use: Not Currently     Comment: SOCIAL OCCASIONS      Family History   Problem Relation Age of Onset    Breast Cancer Mother 72    Cancer Mother         BLADDER AND BREAST    Heart Disease Father     No Known Problems Sister     Anesth Problems Neg Hx      Current Outpatient Medications   Medication Sig    anastrozole (Arimidex) 1 mg tablet Take 1 mg by mouth daily.  ondansetron (ZOFRAN ODT) 4 mg disintegrating tablet Take 1 Tablet by mouth every eight (8) hours as needed for Nausea or Vomiting.  venlafaxine-SR (Effexor XR) 75 mg capsule Take  by mouth daily. No current facility-administered medications for this visit. Allergies   Allergen Reactions    Adhesive Other (comments)     BLISTERS ON SKIN        Review of Systems: A complete review of systems was obtained, negative except as described above. Physical Exam:     There were no vitals taken for this visit. ECOG PS: 0  General: No distress  Eyes: PERRLA, anicteric sclerae  HENT: Atraumatic  CV: Normal rate, no peripheral edema  GI: Soft, nontender, nondistended  MS: Normal gait and station. Digits without clubbing or cyanosis. Skin: No rashes, ecchymoses, or petechiae. Normal temperature, turgor, and texture. Psych: Alert, oriented, appropriate affect, normal judgment/insight    Results:     Lab Results   Component Value Date/Time    WBC 10.4 12/17/2021 10:30 AM    HGB 14.2 12/17/2021 10:30 AM    HCT 42.8 12/17/2021 10:30 AM    PLATELET 337 82/37/1020 10:30 AM    MCV 90.1 12/17/2021 10:30 AM    ABS. NEUTROPHILS 6.0 12/17/2021 10:30 AM     Lab Results   Component Value Date/Time    Sodium 137 11/18/2009 08:40 AM    Potassium 3.7 11/18/2009 08:40 AM    Chloride 99 11/18/2009 08:40 AM    CO2 27 11/18/2009 08:40 AM    Glucose 125 (H) 11/18/2009 08:40 AM    BUN 10 11/18/2009 08:40 AM    Creatinine 0.7 11/18/2009 08:40 AM    GFR est AA >60 11/18/2009 08:40 AM    GFR est non-AA >60 11/18/2009 08:40 AM    Calcium 9.5 11/18/2009 08:40 AM     Lab Results   Component Value Date/Time    Bilirubin, total 0.5 11/18/2009 08:40 AM    ALT (SGPT) 35 11/18/2009 08:40 AM    Alk.  phosphatase 73 11/18/2009 08:40 AM    Protein, total 8.7 (H) 11/18/2009 08:40 AM    Albumin 5.1 (H) 11/18/2009 08:40 AM    Globulin 3.6 11/18/2009 08:40 AM         Records reviewed and summarized above. Pathology report(s) reviewed   12/28/2021  1. [place] lymph node, right axilla, excision:        One lymph node, negative for metastatic carcinoma (0/1)     2. Right breast, lumpectomy:        Ductal carcinoma in situ, grade 1, with focal microinvasion   Previous biopsy site with clip   Margins are uninvolved by in situ or invasive carcinoma   ER/ME positive, HER-2/renetta negative per previous report ([id number])   Fibrocystic changes with usual ductal hyperplasia and sclerosing adenosis   See comment     INVASIVE CARCINOMA OF THE BREAST: Resection    SPECIMEN       Procedure: Excision (less than total mastectomy)       Specimen Laterality: Right    TUMOR       Histologic Type: Micro-invasive carcinoma       Glandular (Acinar) / Tubular Differentiation: Only microinvasion         present (not graded)       Nuclear Pleomorphism: Only microinvasion present (not graded)       Mitotic Rate: Only microinvasion present (not graded)       Overall Grade:  Only microinvasion present (not graded)       Tumor Size: Microinvasion only (<= 1 mm)       Ductal Carcinoma In Situ (DCIS): Present           Ductal Carcinoma In Situ (DCIS): Positive for extensive             intraductal component (EIC)           Size (Extent) of DCIS: 10 mm           Architectural Patterns: Cribriform           Nuclear Grade: Grade I (low)           Necrosis: Not identified    Tumor Extent       Skin: Skin is not present       Skeletal Muscle: Skeletal muscle is not present       Lymphovascular Invasion: Not identified       Treatment Effect in the Breast: No known presurgical therapy    MARGINS       Invasive Carcinoma Margins: Uninvolved by invasive carcinoma           Distance from Closest Margin (Millimeters): Greater than: 7 mm       DCIS Margins: Uninvolved by DCIS           Distance from Closest Margin (Millimeters): 6 mm           Closest Margin(s): Inferior    LYMPH NODES       Regional Lymph Nodes: Uninvolved by tumor cells           Total Number of Lymph Nodes Examined: 1           Number of [place] Nodes Examined: 1      PATHOLOGIC STAGE CLASSIFICATION (pTNM, AJCC 8th Edition)       Primary Tumor (pT): pT1mi       Regional Lymph Nodes Modifier: (sn): [place] node(s) evaluated       Regional Lymph Nodes (pN): pN0       Radiology report(s) reviewed above. MRI 11/2021       Right Breast:  1. BI-RADS Assessment Category 6: Known biopsy proven malignancy- Appropriate  action should be taken. 8 mm mass and associated biopsy clip in the middle third  of the right breast at 8:00, consistent with newly diagnosed breast carcinoma. 2. No evidence of multifocal or multicentric malignancy. 3. No suspicious lymphadenopathy.     Left Breast:  1. BI-RADS Assessment Category 2: Benign finding. Diffuse scattered enhancing  foci. 2. No evidence of breast carcinoma within the left breast.       DEXA  2/2022  This patient is osteopenic using the World Health Organization criteria  10 year probability of major osteoporotic fracture: 6.2%. 10 year probability of hip fracture: 0.5%    Assessment:   1) R breast microinvasive breast cancer with DCIS    S/P Lumpectomy and SLN on 12/28/2021  10 mm DCIS, Microinvasion+, margins negative ( 6-7 mm), 1 negative node  %HI 5% HER2 renetta negative  Grade 1    [id number] Stage IA  S/p RT     No high risk features noted on pathology. Microinvasive breast carcinoma has an excellent prognosis, with a five-year overall survival between 97 and 100 percent   Hence the role of adjuvant endocrine therapy in this context is similar to that for DCIS which is chemoprevention/ which is to reduce the risk of recurrent breast events ( invasive and non invasive ) in both breasts. Addition of endocrine therapy in HR+ DCIS further decreases the risk of recurrent breast events by about 30-40% (including contralateral breast events).      NSABP B-35 which compared 5 years of Tamoxifen versus that of Anastrazole in postmenopausal women following BCT for DCIS showed that compared to Tamoxifen,  Anastrazole reduced the incidence of breast events by ~ 30% and of invasive breast cancer by about 40%. The benefits were observed in women < 60 y/o . Anastrazole may lead to myalgias, arthralgias, osteoporosis / fractures and cardiovascular disease. Baseline DEXA reviewed     2) h/o TIA    3) Depression  Effexor    4) Psychosocial  Coping well      Plan:     · VD and CA  · Start Arimidex 1 daily  · Mammogram per Dr. Rosa Obrien  · Counseled on lifestyle cardiac health. RTC 3 months     I appreciate the opportunity to participate in Ms. Hernandez Core care.     Signed By: Maria Merrill MD

## 2022-05-23 ENCOUNTER — TELEPHONE (OUTPATIENT)
Dept: ONCOLOGY | Age: 57
End: 2022-05-23

## 2022-05-23 DIAGNOSIS — C50.911 MALIGNANT NEOPLASM OF RIGHT BREAST IN FEMALE, ESTROGEN RECEPTOR POSITIVE, UNSPECIFIED SITE OF BREAST (HCC): Primary | ICD-10-CM

## 2022-05-23 DIAGNOSIS — Z17.0 MALIGNANT NEOPLASM OF RIGHT BREAST IN FEMALE, ESTROGEN RECEPTOR POSITIVE, UNSPECIFIED SITE OF BREAST (HCC): Primary | ICD-10-CM

## 2022-05-23 RX ORDER — ANASTROZOLE 1 MG/1
1 TABLET ORAL DAILY
Qty: 30 TABLET | Refills: 5 | Status: SHIPPED | OUTPATIENT
Start: 2022-05-23

## 2022-05-23 NOTE — TELEPHONE ENCOUNTER
Patient called and stated that pharmacy didn't have her anastrozole 1mg. She stated that she had a visit last week.           # [phone]

## 2022-05-23 NOTE — TELEPHONE ENCOUNTER
21 :  Returned call to patient and confirmed x2 identifiers   Patient confirmed pharmacy uses is [facility] at    [address]    Informed would send message to team  Patient verbalized understanding   No new questions or concerns at this time

## 2022-05-23 NOTE — TELEPHONE ENCOUNTER
Oral Hormone therapy     Sonny Jonas is a  49 y. o.female  diagnosed with breast cancer . Ms. Marissa Feliciano is being treated with anastrozole. Medication name: anastrozole    Dose:  1 mg   Frequency: daily    Ordering provider: Caio Gandhi MD  Start date: pending        OV 5/17/22  Next OV 8/24/22    Refill sent to [facility] per patient request.        Delicia Pearson, DANTED, BCOP, BCPS    For Pharmacy Admin Tracking Only     CPA in place:  Yes   Recommendation Provided To: Patient/Caregiver: 1 via Telephone   Intervention Detail: Refill(s) Provided      Intervention Accepted By: Patient/Caregiver: 1   Time Spent (min): 10

## 2022-08-22 ENCOUNTER — TELEPHONE (OUTPATIENT)
Dept: ONCOLOGY | Age: 57
End: 2022-08-22

## 2022-08-22 NOTE — TELEPHONE ENCOUNTER
Called patient regarding reschedule from 8/24/22 to 9/23/22 @ 11:30am.  No answer, left message for patient to call to confirm 9/23/22 @ 11:30am

## 2022-09-21 ENCOUNTER — TELEPHONE (OUTPATIENT)
Dept: ONCOLOGY | Age: 57
End: 2022-09-21

## 2022-11-09 ENCOUNTER — OFFICE VISIT (OUTPATIENT)
Dept: ONCOLOGY | Age: 57
End: 2022-11-09
Payer: COMMERCIAL

## 2022-11-09 VITALS
OXYGEN SATURATION: 98 % | SYSTOLIC BLOOD PRESSURE: 149 MMHG | DIASTOLIC BLOOD PRESSURE: 79 MMHG | HEIGHT: 65 IN | BODY MASS INDEX: 28.79 KG/M2 | HEART RATE: 89 BPM | RESPIRATION RATE: 16 BRPM

## 2022-11-09 DIAGNOSIS — Z17.0 MALIGNANT NEOPLASM OF RIGHT BREAST IN FEMALE, ESTROGEN RECEPTOR POSITIVE, UNSPECIFIED SITE OF BREAST (HCC): Primary | ICD-10-CM

## 2022-11-09 DIAGNOSIS — C50.911 MALIGNANT NEOPLASM OF RIGHT BREAST IN FEMALE, ESTROGEN RECEPTOR POSITIVE, UNSPECIFIED SITE OF BREAST (HCC): Primary | ICD-10-CM

## 2022-11-09 PROCEDURE — 99213 OFFICE O/P EST LOW 20 MIN: CPT | Performed by: INTERNAL MEDICINE

## 2022-11-09 NOTE — PROGRESS NOTES
Katelynn Palumbo is a 49 y.o. female  Chief Complaint   Patient presents with    Follow-up    Breast Cancer     1. Have you been to the ER, urgent care clinic since your last visit? Hospitalized since your last visit? No    2. Have you seen or consulted any other health care providers outside of the [address] [place] [address] since your last visit? Include any pap smears or colon screening.  No

## 2022-11-09 NOTE — PROGRESS NOTES
[facility] at [place] [address] [address], [place]: [phone]  F: [phone]    Reason for Visit:   Katelynn Palumbo is a 49 y.o. female who is seen for R breast microinvasive carcinoma and DCIS    Treatment History:   12/28/2021: R breast lumpectomy / SLN, received RT  5/2022: Arimidex     History of Present Illness:   Patient is a 49 y.o. female seen for above . Had a routine Mammogram 11/4/2021 showed a 5 mm R breast lesion. Biopsy showed micro invasive, gr 1, %, MS 5%, HER2 NEG, KI67 1%, AND DCIS gr 1, %, MS 40%. Had a breast MRI that the lesion was 8 mm with no nodes. Had a lumpectomy and SLN. She goes by [employer]. On Arimidex and comes for a follow up. Rare hot flashes. Has no new joint aches. She has chronic Sciatica which is unchanged. No new lumps, denies SOB, No HA     Oldest graduated high school ( identifies as a him)  Estranged from   She has a h/o TIA  Is on Effexor for depression    Mother had breast cancer in her 76s. She had bladder and skin cancer too. No other FH of cancers     She has a 13 and 15 y/o old.     Past Medical History:   Diagnosis Date    Cancer [facility]) 2021    R BREAST      Past Surgical History:   Procedure Laterality Date    HX BREAST LUMPECTOMY Right 12/28/2021    RIGHT BREAST LUMPECTOMY WITH ULTRASOUND performed by Junior Kolby MD at [facility] MAIN OR     [address]      X 2    HX OOPHORECTOMY Left UNSURE    MS BREAST SURGERY PROCEDURE UNLISTED Right 2021      Social History     Tobacco Use    Smoking status: Never    Smokeless tobacco: Never   Substance Use Topics    Alcohol use: Not Currently     Comment: SOCIAL OCCASIONS      Family History   Problem Relation Age of Onset    Breast Cancer Mother 72    Cancer Mother         BLADDER AND BREAST    Heart Disease Father     No Known Problems Sister     Anesth Problems Neg Hx      Current Outpatient Medications   Medication Sig    anastrozole (Arimidex) 1 mg tablet Take 1 mg by mouth daily. ondansetron (ZOFRAN ODT) 4 mg disintegrating tablet Take 1 Tablet by mouth every eight (8) hours as needed for Nausea or Vomiting. venlafaxine-SR (Effexor XR) 75 mg capsule Take  by mouth daily. No current facility-administered medications for this visit. Allergies   Allergen Reactions    Adhesive Other (comments)     BLISTERS ON SKIN        Review of Systems: A complete review of systems was obtained, negative except as described above. Physical Exam:     Visit Vitals  BP (!) 149/79   Pulse 89   Resp 16   Ht 5' 5\" (1.651 m)   SpO2 98%   BMI 28.79 kg/m²     ECOG PS: 0  General: No distress  Eyes: PERRLA, anicteric sclerae  HENT: Atraumatic  Breast: no palpable masses or adenopathy, skin is unremarkable. GI: Soft, nontender, nondistended  MS: Normal gait and station. Digits without clubbing or cyanosis. Skin: No rashes, ecchymoses, or petechiae. Normal temperature, turgor, and texture. Psych: Alert, oriented, appropriate affect, normal judgment/insight    Results:     Lab Results   Component Value Date/Time    WBC 10.4 12/17/2021 10:30 AM    HGB 14.2 12/17/2021 10:30 AM    HCT 42.8 12/17/2021 10:30 AM    PLATELET 030 65/96/6998 10:30 AM    MCV 90.1 12/17/2021 10:30 AM    ABS. NEUTROPHILS 6.0 12/17/2021 10:30 AM     Lab Results   Component Value Date/Time    Sodium 137 11/18/2009 08:40 AM    Potassium 3.7 11/18/2009 08:40 AM    Chloride 99 11/18/2009 08:40 AM    CO2 27 11/18/2009 08:40 AM    Glucose 125 (H) 11/18/2009 08:40 AM    BUN 10 11/18/2009 08:40 AM    Creatinine 0.7 11/18/2009 08:40 AM    GFR est AA >60 11/18/2009 08:40 AM    GFR est non-AA >60 11/18/2009 08:40 AM    Calcium 9.5 11/18/2009 08:40 AM     Lab Results   Component Value Date/Time    Bilirubin, total 0.5 11/18/2009 08:40 AM    ALT (SGPT) 35 11/18/2009 08:40 AM    Alk.  phosphatase 73 11/18/2009 08:40 AM    Protein, total 8.7 (H) 11/18/2009 08:40 AM    Albumin 5.1 (H) 11/18/2009 08:40 AM    Globulin 3.6 11/18/2009 08:40 AM         Records reviewed and summarized above. Pathology report(s) reviewed   12/28/2021  1. [place] lymph node, right axilla, excision:        One lymph node, negative for metastatic carcinoma (0/1)     2. Right breast, lumpectomy:        Ductal carcinoma in situ, grade 1, with focal microinvasion   Previous biopsy site with clip   Margins are uninvolved by in situ or invasive carcinoma   ER/DC positive, HER-2/renetta negative per previous report ([id number])   Fibrocystic changes with usual ductal hyperplasia and sclerosing adenosis   See comment     INVASIVE CARCINOMA OF THE BREAST: Resection    SPECIMEN       Procedure: Excision (less than total mastectomy)       Specimen Laterality: Right    TUMOR       Histologic Type: Micro-invasive carcinoma       Glandular (Acinar) / Tubular Differentiation: Only microinvasion         present (not graded)       Nuclear Pleomorphism: Only microinvasion present (not graded)       Mitotic Rate: Only microinvasion present (not graded)       Overall Grade:  Only microinvasion present (not graded)       Tumor Size: Microinvasion only (<= 1 mm)       Ductal Carcinoma In Situ (DCIS): Present           Ductal Carcinoma In Situ (DCIS): Positive for extensive             intraductal component (EIC)           Size (Extent) of DCIS: 10 mm           Architectural Patterns: Cribriform           Nuclear Grade: Grade I (low)           Necrosis: Not identified    Tumor Extent       Skin: Skin is not present       Skeletal Muscle: Skeletal muscle is not present       Lymphovascular Invasion: Not identified       Treatment Effect in the Breast: No known presurgical therapy    MARGINS       Invasive Carcinoma Margins: Uninvolved by invasive carcinoma           Distance from Closest Margin (Millimeters): Greater than: 7 mm       DCIS Margins: Uninvolved by DCIS           Distance from Closest Margin (Millimeters): 6 mm           Closest Margin(s): Inferior    LYMPH NODES       Regional Lymph Nodes: Uninvolved by tumor cells           Total Number of Lymph Nodes Examined: 1           Number of [place] Nodes Examined: 1      PATHOLOGIC STAGE CLASSIFICATION (pTNM, AJCC 8th Edition)       Primary Tumor (pT): pT1mi       Regional Lymph Nodes Modifier: (sn): [place] node(s) evaluated       Regional Lymph Nodes (pN): pN0       Radiology report(s) reviewed above. MRI 11/2021       Right Breast:  1. BI-RADS Assessment Category 6: Known biopsy proven malignancy- Appropriate  action should be taken. 8 mm mass and associated biopsy clip in the middle third  of the right breast at 8:00, consistent with newly diagnosed breast carcinoma. 2. No evidence of multifocal or multicentric malignancy. 3. No suspicious lymphadenopathy. Left Breast:  1. BI-RADS Assessment Category 2: Benign finding. Diffuse scattered enhancing  foci. 2. No evidence of breast carcinoma within the left breast.       DEXA  2/2022  This patient is osteopenic using the World Health Organization criteria  10 year probability of major osteoporotic fracture: 6.2%. 10 year probability of hip fracture: 0.5%    Assessment:   1) R breast microinvasive breast cancer with DCIS    S/P Lumpectomy and SLN on 12/28/2021  10 mm DCIS, Microinvasion+, margins negative ( 6-7 mm), 1 negative node  %VA 5% HER2 renetta negative  Grade 1    [id number] Stage IA  S/p RT   Started Arimidex 5/2022  Tolerating well  Exam unremarkable  Mammogram 8/2022- at [facility] reportedly normal   Discussed DEXA and surveillance       2) h/o TIA    3) Depression  Effexor    4) Psychosocial  Coping well      Plan:     VD and CA  Arimidex 5 years  Mammogram per Dr. Martha Perez and Dr Clara Boothe on lifestyle cardiac health. RTC 6 months     I appreciate the opportunity to participate in Ms. Gamal bowden.     Signed By: Tawanna Bartholomew MD

## 2023-05-07 ENCOUNTER — TELEPHONE (OUTPATIENT)
Age: 58
End: 2023-05-07

## (undated) DEVICE — GARMENT,MEDLINE,DVT,INT,CALF,MED, GEN2: Brand: MEDLINE

## (undated) DEVICE — RESERVOIR,SUCTION,100CC,SILICONE: Brand: MEDLINE

## (undated) DEVICE — TOWEL SURG W17XL27IN STD BLU COT NONFENESTRATED PREWASHED

## (undated) DEVICE — PENCIL SMK EVAC L10FT DIA95MM TBNG NONSTICK W ADPT TO 22MM

## (undated) DEVICE — GLOVE SURG SZ 6 L12IN FNGR THK79MIL GRN LTX FREE

## (undated) DEVICE — PREP SKN CHLRAPRP APL 26ML STR --

## (undated) DEVICE — HANDLE LT SNAP ON ULT DURABLE LENS FOR TRUMPF ALC DISPOSABLE

## (undated) DEVICE — SUTURE MCRYL SZ 4-0 L27IN ABSRB UD L19MM PS-2 1/2 CIR PRIM Y426H

## (undated) DEVICE — TUBING, SUCTION, 1/4" X 12', STRAIGHT: Brand: MEDLINE

## (undated) DEVICE — SUPPORT MAMM SURG 48-50 IN 2XL BRA

## (undated) DEVICE — 1010 S-DRAPE TOWEL DRAPE 10/BX: Brand: STERI-DRAPE™

## (undated) DEVICE — DRAIN SURG 19FR 100% SIL RADPQ RND CHN FULL FLUT

## (undated) DEVICE — COVER US PRB W12XL244CM FLD IORT STR TIP

## (undated) DEVICE — SUT SLK 2-0SH 30IN BLK --

## (undated) DEVICE — SPONGE LAP 18X18IN STRL -- 5/PK

## (undated) DEVICE — SPONGE GZ W4XL4IN COT 12 PLY TYP VII WVN C FLD DSGN

## (undated) DEVICE — HYPODERMIC SAFETY NEEDLE: Brand: MONOJECT

## (undated) DEVICE — WRAP SURG W1.31XL1.34M CARD FOR PT 165-172CM THERMOWRP

## (undated) DEVICE — CHEST PACK: Brand: MEDLINE INDUSTRIES, INC.

## (undated) DEVICE — DRAPE,CHEST,FENES,15X10,STERIL: Brand: MEDLINE

## (undated) DEVICE — DRAPE,REIN 53X77,STERILE: Brand: MEDLINE

## (undated) DEVICE — BASIN ST MAJOR-NO CAUTERY: Brand: MEDLINE INDUSTRIES, INC.

## (undated) DEVICE — SOLUTION IRRIG 1000ML 0.9% SOD CHL USP POUR PLAS BTL

## (undated) DEVICE — INSULATED BLADE ELECTRODE: Brand: EDGE

## (undated) DEVICE — CURVED, SMALL JAW, OPEN SEALER/DIVIDER: Brand: LIGASURE

## (undated) DEVICE — REM POLYHESIVE ADULT PATIENT RETURN ELECTRODE: Brand: VALLEYLAB

## (undated) DEVICE — 3M™ TEGADERM™ TRANSPARENT FILM DRESSING FRAME STYLE, 1626W, 4 IN X 4-3/4 IN (10 CM X 12 CM), 50/CT 4CT/CASE: Brand: 3M™ TEGADERM™

## (undated) DEVICE — DERMABOND SKIN ADH 0.7ML -- DERMABOND ADVANCED 12/BX

## (undated) DEVICE — SYR 10ML LUER LOK 1/5ML GRAD --

## (undated) DEVICE — YANKAUER,TAPERED BULBOUS TIP,W/O VENT: Brand: MEDLINE

## (undated) DEVICE — INTRO 6F 23CM INPT INTR KIT --